# Patient Record
Sex: FEMALE | Race: WHITE | NOT HISPANIC OR LATINO | Employment: FULL TIME | URBAN - NONMETROPOLITAN AREA
[De-identification: names, ages, dates, MRNs, and addresses within clinical notes are randomized per-mention and may not be internally consistent; named-entity substitution may affect disease eponyms.]

---

## 2024-06-09 ENCOUNTER — OFFICE VISIT (OUTPATIENT)
Dept: URGENT CARE | Facility: CLINIC | Age: 49
End: 2024-06-09
Payer: COMMERCIAL

## 2024-06-09 VITALS
SYSTOLIC BLOOD PRESSURE: 130 MMHG | HEART RATE: 110 BPM | RESPIRATION RATE: 18 BRPM | WEIGHT: 158 LBS | HEIGHT: 64 IN | OXYGEN SATURATION: 99 % | TEMPERATURE: 99.5 F | BODY MASS INDEX: 26.98 KG/M2 | DIASTOLIC BLOOD PRESSURE: 70 MMHG

## 2024-06-09 DIAGNOSIS — J02.0 STREP PHARYNGITIS: Primary | ICD-10-CM

## 2024-06-09 LAB — S PYO AG THROAT QL: POSITIVE

## 2024-06-09 PROCEDURE — 87880 STREP A ASSAY W/OPTIC: CPT

## 2024-06-09 PROCEDURE — G0382 LEV 3 HOSP TYPE B ED VISIT: HCPCS

## 2024-06-09 PROCEDURE — S9083 URGENT CARE CENTER GLOBAL: HCPCS

## 2024-06-09 RX ORDER — ESCITALOPRAM OXALATE 20 MG/1
20 TABLET ORAL DAILY
COMMUNITY

## 2024-06-09 RX ORDER — AZITHROMYCIN 250 MG/1
500 TABLET, FILM COATED ORAL EVERY 24 HOURS
Qty: 10 TABLET | Refills: 0 | Status: SHIPPED | OUTPATIENT
Start: 2024-06-09 | End: 2024-06-14

## 2024-06-09 RX ORDER — RIZATRIPTAN BENZOATE 10 MG/1
10 TABLET ORAL AS NEEDED
COMMUNITY

## 2024-06-09 RX ORDER — ROSUVASTATIN CALCIUM 10 MG/1
10 TABLET, COATED ORAL DAILY
COMMUNITY

## 2024-06-09 RX ORDER — LEVOTHYROXINE SODIUM 0.1 MG/1
100 TABLET ORAL DAILY
COMMUNITY

## 2024-06-09 NOTE — PROGRESS NOTES
[facility]        NAME: Brandi Mccrary is a 49 y.o. female  : 1975    MRN: [id number]  DATE: 2024  TIME: 1:23 PM    Assessment and Plan   Strep pharyngitis [J02.0]  1. Strep pharyngitis  POCT rapid ANTIGEN strepA    azithromycin (ZITHROMAX) 250 mg tablet        Rapid POC strep testing positive. Will treat with Azithromycin and encouraged continued supportive measures.  Contagious for 24 hours. Follow up with PCP in 3-5 days or proceed to emergency department for worsening symptoms.  Patient verbalized understanding of instructions given.       Patient Instructions     Patient Instructions     Rapid POC strep testing positive  Take antibiotic as prescribed  Contagious for 24 hours  Continue with supportive measures, OTC Tylenol/Ibuprofen, nasal decongestants, and cough suppressants   Cool mist humidifiers, throat lozenges, salt gargles, honey, increased fluid intake and rest   Follow up with PCP in 3-5 days  Present to ER if symptoms worsen       If tests have been performed at [facility], our office will contact you with results if changes need to be made to the care plan discussed with you at the visit.  You can review your full results on [facility] MyChart.    Strep Throat   AMBULATORY CARE:   Strep throat  is a throat infection caused by bacteria. It is easily spread from person to person.  Common symptoms include the following:   Sore, red, and swollen throat    Fever and headache     Upset stomach, abdominal pain, or vomiting    White or yellow patches or blisters in the back of your throat    Tender, swollen lumps on the sides of your neck or jaw    Throat pain when you swallow    Call 911 for any of the following:   You have trouble breathing.      Seek care immediately if:   You have new symptoms like a bad headache, stiff neck, chest pain, or vomiting.    You are drooling because you cannot swallow your spit.    Contact your healthcare provider if:   You have a fever.    You have  a rash or ear pain.    You have green, yellow-brown, or bloody mucus when you cough or blow your nose.    You are unable to drink anything.    You have questions or concerns about your condition or care.    Treatment for strep throat  may include antibiotic medicine to treat your strep throat. You should feel better within 2 to 3 days after you start antibiotics. You may return to work or school 24 hours after you start antibiotics.  Manage strep throat:   Use lozenges, ice, soft foods, or popsicles  to soothe your throat.    Drink juice, milk shakes, or soup  if your throat is too sore to eat solid food. Drinking liquids can also help prevent dehydration.    Gargle with salt water.  Mix ¼ teaspoon salt in a glass of warm water and gargle. This may help reduce swelling in your throat.    Do not smoke.  Nicotine and other chemicals in cigarettes and cigars can cause lung damage and make your symptoms worse. Ask your healthcare provider for information if you currently smoke and need help to quit. E-cigarettes or smokeless tobacco still contain nicotine. Talk to your healthcare provider before you use these products.    Prevent the spread of strep throat:   Wash your hands often.  Use soap and water. Wash your hands after you use the bathroom, change a child's diapers, or sneeze. Wash your hands before you prepare or eat food.     Do not share food or drinks.  Replace your toothbrush after you have taken antibiotics for 24 hours.    Follow up with your doctor as directed:  Write down your questions so you remember to ask them during your visits.  © Copyright [employer] 2023 Information is for End User's use only and may not be sold, redistributed or otherwise used for commercial purposes.  The above information is an  only. It is not intended as medical advice for individual conditions or treatments. Talk to your doctor, nurse or pharmacist before following any medical regimen to see if it is safe and  effective for you.        Chief Complaint     Chief Complaint   Patient presents with    Sore Throat     Around Wed she woke up with pain in her throat, hurts when she swallows, and feels her throat glands are tender and swollen         History of Present Illness       49-year-old female with no significant past medical history presents with complaints of sore throat, trouble swallowing, and swollen lymph nodes x 4 days.  Denies fever, nasal congestion, cough, vomiting, or diarrhea.  Denies known sick contacts or exposures.     Sore Throat   Associated symptoms include trouble swallowing. Pertinent negatives include no abdominal pain, congestion, coughing, diarrhea, ear discharge, ear pain, shortness of breath or vomiting.       Review of Systems   Review of Systems   Constitutional:  Negative for chills and fever.   HENT:  Positive for sore throat and trouble swallowing. Negative for congestion, ear discharge, ear pain, rhinorrhea and voice change.    Eyes:  Negative for discharge.   Respiratory:  Negative for cough, shortness of breath and wheezing.    Cardiovascular:  Negative for chest pain.   Gastrointestinal:  Negative for abdominal pain, diarrhea, nausea and vomiting.   Musculoskeletal:  Negative for myalgias.   Skin:  Negative for rash.         Current Medications       Current Outpatient Medications:     azithromycin (ZITHROMAX) 250 mg tablet, Take 2 tablets (500 mg total) by mouth every 24 hours for 5 days, Disp: 10 tablet, Rfl: 0    escitalopram (LEXAPRO) 20 mg tablet, Take 20 mg by mouth daily, Disp: , Rfl:     estradiol (CLIMARA) 0.1 mg/24 hr, Place 1 patch on the skin once a week, Disp: , Rfl:     levothyroxine 100 mcg tablet, Take 100 mcg by mouth daily, Disp: , Rfl:     rizatriptan (MAXALT) 10 mg tablet, Take 10 mg by mouth as needed for migraine Take at the onset of migraine; if symptoms continue or return, may take another dose at least 2 hours after first dose. Take no more than 2 doses in a day.,  "Disp: , Rfl:     rosuvastatin (CRESTOR) 10 MG tablet, Take 10 mg by mouth daily, Disp: , Rfl:     Current Allergies     Allergies as of 06/09/2024 - Reviewed 06/09/2024   Allergen Reaction Noted    Cipro [ciprofloxacin hcl] Swelling 06/09/2024    Covid-19 ad26 vaccine([employer]) Other (See Comments) 06/09/2024    Penicillins Rash 06/09/2024            The following portions of the patient's history were reviewed and updated as appropriate: allergies, current medications, past family history, past medical history, past social history, past surgical history and problem list.     Past Medical History:   Diagnosis Date    Disease of thyroid gland     High cholesterol        Past Surgical History:   Procedure Laterality Date    HYSTERECTOMY      KIDNEY SURGERY      WISDOM TOOTH EXTRACTION         Family History   Problem Relation Age of Onset    Heart disease Mother     Cancer Mother     Diabetes Father     Diabetes Maternal Grandmother          Medications have been verified.        Objective   /70   Pulse (!) 110   Temp 99.5 °F (37.5 °C)   Resp 18   Ht 5' 4\" (1.626 m)   Wt 71.7 kg (158 lb)   SpO2 99%   BMI 27.12 kg/m²   No LMP recorded. Patient has had a hysterectomy.       Physical Exam     Physical Exam  Vitals and nursing note reviewed.   Constitutional:       General: She is not in acute distress.     Appearance: She is not toxic-appearing.   HENT:      Head: Normocephalic.      Right Ear: Tympanic membrane, ear canal and external ear normal.      Left Ear: Tympanic membrane, ear canal and external ear normal.      Nose: Nose normal.      Mouth/Throat:      Mouth: Mucous membranes are moist.      Pharynx: Posterior oropharyngeal erythema present.      Tonsils: No tonsillar exudate. 2+ on the right. 2+ on the left.   Eyes:      Conjunctiva/sclera: Conjunctivae normal.   Cardiovascular:      Rate and Rhythm: Regular rhythm. Tachycardia present.      Heart sounds: Normal heart sounds.   Pulmonary:      " Effort: Pulmonary effort is normal. No respiratory distress.      Breath sounds: Normal breath sounds. No stridor. No wheezing, rhonchi or rales.   Lymphadenopathy:      Cervical: Cervical adenopathy present.   Skin:     General: Skin is warm and dry.   Neurological:      Mental Status: She is alert and oriented to person, place, and time.      Gait: Gait is intact.   Psychiatric:         Mood and Affect: Mood normal.         Behavior: Behavior normal.

## 2024-06-09 NOTE — PATIENT INSTRUCTIONS
Rapid POC strep testing positive  Take antibiotic as prescribed  Contagious for 24 hours  Continue with supportive measures, OTC Tylenol/Ibuprofen, nasal decongestants, and cough suppressants   Cool mist humidifiers, throat lozenges, salt gargles, honey, increased fluid intake and rest   Follow up with PCP in 3-5 days  Present to ER if symptoms worsen       If tests have been performed at [facility], our office will contact you with results if changes need to be made to the care plan discussed with you at the visit.  You can review your full results on [facility].    Strep Throat   AMBULATORY CARE:   Strep throat  is a throat infection caused by bacteria. It is easily spread from person to person.  Common symptoms include the following:   Sore, red, and swollen throat    Fever and headache     Upset stomach, abdominal pain, or vomiting    White or yellow patches or blisters in the back of your throat    Tender, swollen lumps on the sides of your neck or jaw    Throat pain when you swallow    Call 911 for any of the following:   You have trouble breathing.      Seek care immediately if:   You have new symptoms like a bad headache, stiff neck, chest pain, or vomiting.    You are drooling because you cannot swallow your spit.    Contact your healthcare provider if:   You have a fever.    You have a rash or ear pain.    You have green, yellow-brown, or bloody mucus when you cough or blow your nose.    You are unable to drink anything.    You have questions or concerns about your condition or care.    Treatment for strep throat  may include antibiotic medicine to treat your strep throat. You should feel better within 2 to 3 days after you start antibiotics. You may return to work or school 24 hours after you start antibiotics.  Manage strep throat:   Use lozenges, ice, soft foods, or popsicles  to soothe your throat.    Drink juice, milk shakes, or soup  if your throat is too sore to eat solid food. Drinking  liquids can also help prevent dehydration.    Gargle with salt water.  Mix ¼ teaspoon salt in a glass of warm water and gargle. This may help reduce swelling in your throat.    Do not smoke.  Nicotine and other chemicals in cigarettes and cigars can cause lung damage and make your symptoms worse. Ask your healthcare provider for information if you currently smoke and need help to quit. E-cigarettes or smokeless tobacco still contain nicotine. Talk to your healthcare provider before you use these products.    Prevent the spread of strep throat:   Wash your hands often.  Use soap and water. Wash your hands after you use the bathroom, change a child's diapers, or sneeze. Wash your hands before you prepare or eat food.     Do not share food or drinks.  Replace your toothbrush after you have taken antibiotics for 24 hours.    Follow up with your doctor as directed:  Write down your questions so you remember to ask them during your visits.  © Copyright [employer] 2023 Information is for End User's use only and may not be sold, redistributed or otherwise used for commercial purposes.  The above information is an  only. It is not intended as medical advice for individual conditions or treatments. Talk to your doctor, nurse or pharmacist before following any medical regimen to see if it is safe and effective for you.

## 2024-11-11 ENCOUNTER — OFFICE VISIT (OUTPATIENT)
Dept: URGENT CARE | Facility: CLINIC | Age: 49
End: 2024-11-11
Payer: COMMERCIAL

## 2024-11-11 VITALS
RESPIRATION RATE: 16 BRPM | BODY MASS INDEX: 44.3 KG/M2 | HEART RATE: 103 BPM | OXYGEN SATURATION: 97 % | SYSTOLIC BLOOD PRESSURE: 126 MMHG | TEMPERATURE: 97.1 F | DIASTOLIC BLOOD PRESSURE: 76 MMHG | WEIGHT: 250 LBS | HEIGHT: 63 IN

## 2024-11-11 DIAGNOSIS — R10.31 RIGHT LOWER QUADRANT ABDOMINAL PAIN: Primary | ICD-10-CM

## 2024-11-11 PROCEDURE — G0382 LEV 3 HOSP TYPE B ED VISIT: HCPCS

## 2024-11-11 PROCEDURE — S9083 URGENT CARE CENTER GLOBAL: HCPCS

## 2024-11-11 NOTE — PROGRESS NOTES
[facility]        NAME: Brandi Mccrary is a 49 y.o. female  : 1975    MRN: [id number]  DATE: 2024  TIME: 3:40 PM    Assessment and Plan   Right lower quadrant abdominal pain [R10.31]  1. Right lower quadrant abdominal pain  Transfer to other facility        Discussed problem with patient.  Unclear etiology of symptoms.  Discussed ER outpatient follow-up for this since she is in no acute distress and low suspicion for appendicitis.  Patient also reports to the ER for further evaluation placing transfer of care order.  Patient reporting to [facility] via her own personal vehicle    Patient Instructions       Follow up with PCP in 3-5 days.  Proceed to  ER if symptoms worsen.    If tests are performed, our office will contact you with results only if changes need to made to the care plan discussed with you at the visit. You can review your full results on [facility].    Chief Complaint     Chief Complaint   Patient presents with    Abdominal Pain     Pt states that she has pain in her lower right front abdomin area for 5 days          History of Present Illness       49-year-old female presents with right lower quadrant pain for the last 5 days.  Past medical history of kidney stones as well as total hysterectomy.  Denies any fevers, chills, nausea, diarrhea, constipation.  Otherwise feels well except for abdominal pain.  Denies any urinary complaints but reports she has been struggling with urinary retention.  Eating drinking normally.6/10. Dull aching pain.   .    Abdominal Pain  Pertinent negatives include no constipation, diarrhea, fever, myalgias, nausea or vomiting.       Review of Systems   Review of Systems   Constitutional:  Negative for appetite change, chills, fatigue and fever.   Respiratory:  Negative for cough, shortness of breath, wheezing and stridor.    Cardiovascular:  Negative for chest pain and palpitations.   Gastrointestinal:  Positive for abdominal  "pain. Negative for blood in stool, constipation, diarrhea, nausea and vomiting.   Musculoskeletal:  Negative for myalgias.         Current Medications       Current Outpatient Medications:     escitalopram (LEXAPRO) 20 mg tablet, Take 20 mg by mouth daily, Disp: , Rfl:     estradiol (CLIMARA) 0.1 mg/24 hr, Place 1 patch on the skin once a week, Disp: , Rfl:     levothyroxine 100 mcg tablet, Take 100 mcg by mouth daily, Disp: , Rfl:     rizatriptan (MAXALT) 10 mg tablet, Take 10 mg by mouth as needed for migraine Take at the onset of migraine; if symptoms continue or return, may take another dose at least 2 hours after first dose. Take no more than 2 doses in a day., Disp: , Rfl:     rosuvastatin (CRESTOR) 10 MG tablet, Take 10 mg by mouth daily, Disp: , Rfl:     Current Allergies     Allergies as of 11/11/2024 - Reviewed 11/11/2024   Allergen Reaction Noted    Covid-19 (adenovirus) vaccine Swelling 08/20/2021    Cipro [ciprofloxacin hcl] Swelling 06/09/2024    Covid-19 ad26 vaccine([employer]) Other (See Comments) 06/09/2024    Penicillins Rash 06/09/2024    Pseudoephedrine Palpitations 05/02/2018            The following portions of the patient's history were reviewed and updated as appropriate: allergies, current medications, past family history, past medical history, past social history, past surgical history and problem list.     Past Medical History:   Diagnosis Date    Disease of thyroid gland     High cholesterol        Past Surgical History:   Procedure Laterality Date    HYSTERECTOMY      KIDNEY SURGERY      WISDOM TOOTH EXTRACTION         Family History   Problem Relation Age of Onset    Heart disease Mother     Cancer Mother     Diabetes Father     Diabetes Maternal Grandmother          Medications have been verified.        Objective   /76   Pulse 103   Temp (!) 97.1 °F (36.2 °C)   Resp 16   Ht 5' 3\" (1.6 m)   Wt 113 kg (250 lb)   SpO2 97%   BMI 44.29 kg/m²        Physical Exam     Physical " Exam  Vitals and nursing note reviewed.   Constitutional:       General: She is not in acute distress.     Appearance: She is well-developed and normal weight. She is not ill-appearing, toxic-appearing or diaphoretic.   HENT:      Head: Normocephalic.      Mouth/Throat:      Mouth: Mucous membranes are moist.      Pharynx: Oropharynx is clear. No pharyngeal swelling or oropharyngeal exudate.   Eyes:      General: No scleral icterus.     Extraocular Movements: Extraocular movements intact.      Pupils: Pupils are equal, round, and reactive to light.   Cardiovascular:      Rate and Rhythm: Normal rate and regular rhythm.      Heart sounds: Normal heart sounds. No murmur heard.     No friction rub. No gallop.   Pulmonary:      Effort: Pulmonary effort is normal. No respiratory distress.      Breath sounds: Normal breath sounds. No stridor. No wheezing, rhonchi or rales.   Chest:      Chest wall: No tenderness.   Abdominal:      General: Abdomen is flat. Bowel sounds are normal. There is no distension or abdominal bruit. There are no signs of injury.      Palpations: Abdomen is soft. There is no shifting dullness, fluid wave, hepatomegaly, splenomegaly, mass or pulsatile mass.      Tenderness: There is abdominal tenderness in the right lower quadrant. There is no right CVA tenderness, left CVA tenderness, guarding or rebound. Negative signs include Haywood's sign, Rovsing's sign, McBurney's sign, psoas sign and obturator sign.      Hernia: No hernia is present.   Neurological:      Mental Status: She is alert.

## 2024-11-12 ENCOUNTER — TELEPHONE (OUTPATIENT)
Age: 49
End: 2024-11-12

## 2024-11-12 NOTE — TELEPHONE ENCOUNTER
Patient calling in to schedule kidney stone apt. Patient will call back once records are in. She will reach out to [facility] records department.

## 2024-11-13 NOTE — TELEPHONE ENCOUNTER
Diagnosis/Complaint:Stones    Insurance:[facility] ID#[id number]  gp#[id number]    History cancer:no    Previous urologist:    Outside testing/where:[facility] ed    If yes what kind:ct scan    Records requested:pt faxing    Preferred location:[place]

## 2024-11-13 NOTE — TELEPHONE ENCOUNTER
Left detailed message for patient that we do not have any sooner appointments at this time. Left message regarding ER precautions and also if something sooner comes available will contact patient.

## 2024-11-13 NOTE — TELEPHONE ENCOUNTER
Npt scheduled 12/12/24 I was unable to link [facility] care everywhere for updated records pt faxing medical records from recent [facility] ED visit,if needs to be seen sooner than scheduled appointment please contact her directly

## 2024-11-22 ENCOUNTER — TELEPHONE (OUTPATIENT)
Dept: UROLOGY | Facility: CLINIC | Age: 49
End: 2024-11-22

## 2024-11-22 NOTE — TELEPHONE ENCOUNTER
Left message for pt to call office back. Please get more information. Did pt ever go to ER like recommended at . Did pt have any imaging. Pt is not participating in care everywhere so I am unable to see if she presented to ER or had CT scan done. Is pt still experiencing pain? Is she having any blood in her urine.

## 2024-11-22 NOTE — TELEPHONE ENCOUNTER
Patient stated she went to ER 11/11/24 at [facility] and she went to her family doctor on 11/13/24. She will have the family doctor fax over the information including ct scan.  She stated she will speak to [facility] to see why care everywhere is not updating.  She will call next week to make sure records were received. Fax number was provided for the office.

## 2024-12-02 PROBLEM — Z87.442 HISTORY OF RENAL CALCULI: Status: ACTIVE | Noted: 2024-12-02

## 2024-12-02 PROBLEM — N39.3 URINARY, INCONTINENCE, STRESS FEMALE: Status: ACTIVE | Noted: 2024-12-02

## 2024-12-02 NOTE — PROGRESS NOTES
UROLOGY PROGRESS NOTE         NAME: Brandi Mccrary  AGE: 49 y.o. SEX: female  : 1975   MRN: [id number]    DATE: 2024  TIME: 7:55 AM    Assessment and Plan   Procedures     Impression:   1. History of renal calculi  2. Urinary, incontinence, stress female  3. Renal calculi  4. Ureteral calculi  5. Hydronephrosis with urinary obstruction due to ureteral calculus       Plan: Will continue Kegels she will try Ditropan XL 10 mg for mixed incontinence.  We gave her a strainer, we will set up for CT stone protocol next week call her with the results.  She will continue Flomax.      Chief Complaint   No chief complaint on file.    History of Present Illness     HPI: Brandi Mccrary is a 49 y.o. year old female who presents with evaluation of renal calculi.  Apparently there are records from [facility].  Also with a history of stress urinary incontinence.    Last CT scan I see is from  that showed 5 mm stone in the right ureter at the level of the distal ureter.  Bilateral renal calculi.  Apparently patient in ER at [facility] 2024 with right lower quadrant pain.  CT scan showed a right obstructing stone.  There was a obstructing stone in the right mid ureter.  It was measured 4 mm.  She had nonobstructing bilateral renal stones largest 5 mm.  Urinalysis in the ER did not show infection normal creatinine.    Patient with both stress and urge incontinence stress is more bothersome she is doing Kegels.  We talked about an [facility] that she is interested in.    Regarding the stone she would like to manage it nonsurgically requesting to continue Flomax, strainer and consider oral Toradol.    The following portions of the patient's history were reviewed and updated as appropriate: allergies, current medications, past family history, past medical history, past social history, past surgical history and problem list.  Past Medical History:   Diagnosis Date    Anxiety     BRCA gene mutation  negative     Disease of thyroid gland     Endometriosis     High cholesterol     Hypercholesterolemia     Hypothyroidism     Kidney stones     Migraine     Ovarian cyst      Past Surgical History:   Procedure Laterality Date    HYSTERECTOMY      KIDNEY SURGERY      WISDOM TOOTH EXTRACTION       shoulder  Review of Systems     Const: Denies chills, fever and weight loss.  CV: Denies chest pain.  Resp: Denies SOB.  GI: Denies abdominal pain, nausea and vomiting.  : Denies symptoms other than stated above.  Musculo: Denies back pain.    Objective   There were no vitals taken for this visit.    Physical Exam  Const: Appears healthy and well developed. No signs of acute distress present.  Resp: Respirations are regular and unlabored.   CV: Rate is regular. Rhythm is regular.  Abdomen: Abdomen is soft, nontender, and nondistended. Kidneys are not palpable.  : Right lower quadrant pain  Psych: Patient's attitude is cooperative. Mood is normal. Affect is normal.    Current Medications     Current Outpatient Medications:     escitalopram (LEXAPRO) 20 mg tablet, Take 20 mg by mouth daily, Disp: , Rfl:     estradiol (CLIMARA) 0.1 mg/24 hr, Place 1 patch on the skin once a week, Disp: , Rfl:     levothyroxine 100 mcg tablet, Take 100 mcg by mouth daily, Disp: , Rfl:     Multiple Vitamins-Minerals (MULTIVITAL-M PO), Take by mouth, Disp: , Rfl:     rizatriptan (MAXALT) 10 mg tablet, Take 10 mg by mouth as needed for migraine Take at the onset of migraine; if symptoms continue or return, may take another dose at least 2 hours after first dose. Take no more than 2 doses in a day., Disp: , Rfl:     rosuvastatin (CRESTOR) 10 MG tablet, Take 10 mg by mouth daily, Disp: , Rfl:         Greg Manzano MD

## 2024-12-11 PROBLEM — N20.0 RENAL CALCULI: Status: ACTIVE | Noted: 2024-12-11

## 2024-12-11 PROBLEM — N13.2 HYDRONEPHROSIS WITH URINARY OBSTRUCTION DUE TO URETERAL CALCULUS: Status: ACTIVE | Noted: 2024-12-11

## 2024-12-11 PROBLEM — N20.1 URETERAL CALCULI: Status: ACTIVE | Noted: 2024-12-11

## 2024-12-12 ENCOUNTER — OFFICE VISIT (OUTPATIENT)
Dept: UROLOGY | Facility: CLINIC | Age: 49
End: 2024-12-12
Payer: COMMERCIAL

## 2024-12-12 VITALS
HEIGHT: 63 IN | HEART RATE: 99 BPM | OXYGEN SATURATION: 98 % | WEIGHT: 162 LBS | TEMPERATURE: 98 F | SYSTOLIC BLOOD PRESSURE: 122 MMHG | BODY MASS INDEX: 28.7 KG/M2 | DIASTOLIC BLOOD PRESSURE: 84 MMHG

## 2024-12-12 DIAGNOSIS — N20.0 RENAL CALCULI: ICD-10-CM

## 2024-12-12 DIAGNOSIS — N20.1 URETERAL CALCULI: ICD-10-CM

## 2024-12-12 DIAGNOSIS — Z87.442 HISTORY OF RENAL CALCULI: Primary | ICD-10-CM

## 2024-12-12 DIAGNOSIS — N13.2 HYDRONEPHROSIS WITH URINARY OBSTRUCTION DUE TO URETERAL CALCULUS: ICD-10-CM

## 2024-12-12 DIAGNOSIS — N39.3 URINARY, INCONTINENCE, STRESS FEMALE: ICD-10-CM

## 2024-12-12 LAB
SL AMB  POCT GLUCOSE, UA: NORMAL
SL AMB LEUKOCYTE ESTERASE,UA: NORMAL
SL AMB POCT BILIRUBIN,UA: NORMAL
SL AMB POCT BLOOD,UA: NORMAL
SL AMB POCT CLARITY,UA: CLEAR
SL AMB POCT COLOR,UA: YELLOW
SL AMB POCT KETONES,UA: NORMAL
SL AMB POCT NITRITE,UA: NORMAL
SL AMB POCT PH,UA: 7
SL AMB POCT SPECIFIC GRAVITY,UA: 1.01
SL AMB POCT URINE PROTEIN: NORMAL
SL AMB POCT UROBILINOGEN: 0.2

## 2024-12-12 PROCEDURE — 99204 OFFICE O/P NEW MOD 45 MIN: CPT | Performed by: UROLOGY

## 2024-12-12 PROCEDURE — 81003 URINALYSIS AUTO W/O SCOPE: CPT | Performed by: UROLOGY

## 2024-12-12 RX ORDER — KETOROLAC TROMETHAMINE 10 MG/1
10 TABLET, FILM COATED ORAL EVERY 6 HOURS PRN
Qty: 337.5 TABLET | Refills: 0 | Status: SHIPPED | OUTPATIENT
Start: 2024-12-12

## 2024-12-12 RX ORDER — TAMSULOSIN HYDROCHLORIDE 0.4 MG/1
1 CAPSULE ORAL DAILY
COMMUNITY
Start: 2024-11-20

## 2024-12-12 RX ORDER — OXYBUTYNIN CHLORIDE 10 MG/1
10 TABLET, EXTENDED RELEASE ORAL
Qty: 90 TABLET | Refills: 3 | Status: SHIPPED | OUTPATIENT
Start: 2024-12-12

## 2024-12-12 RX ORDER — ONDANSETRON 4 MG/1
TABLET, ORALLY DISINTEGRATING ORAL
COMMUNITY
Start: 2024-11-11

## 2024-12-17 ENCOUNTER — HOSPITAL ENCOUNTER (OUTPATIENT)
Dept: CT IMAGING | Facility: HOSPITAL | Age: 49
Discharge: HOME | End: 2024-12-17
Attending: UROLOGY
Payer: COMMERCIAL

## 2024-12-17 DIAGNOSIS — N20.1 URETERAL CALCULI: ICD-10-CM

## 2024-12-17 PROCEDURE — 74176 CT ABD & PELVIS W/O CONTRAST: CPT

## 2024-12-18 ENCOUNTER — TELEPHONE (OUTPATIENT)
Dept: UROLOGY | Facility: CLINIC | Age: 49
End: 2024-12-18

## 2024-12-18 ENCOUNTER — RESULTS FOLLOW-UP (OUTPATIENT)
Dept: UROLOGY | Facility: CLINIC | Age: 49
End: 2024-12-18

## 2024-12-18 DIAGNOSIS — N20.0 RENAL CALCULI: Primary | ICD-10-CM

## 2024-12-18 RX ORDER — KETOROLAC TROMETHAMINE 10 MG/1
10 TABLET, FILM COATED ORAL EVERY 6 HOURS PRN
Qty: 20 TABLET | Refills: 0 | Status: CANCELLED | OUTPATIENT
Start: 2024-12-18 | End: 2024-12-23

## 2024-12-18 NOTE — TELEPHONE ENCOUNTER
Patient called stating she got a call from DR. Manzano this morning . Message below    Left voicemail for patient to call us back regarding her CAT scan results and still shows the ureteral stone.  Her options include observation or surgical.  I told the patient to call today otherwise I will be gone for 3 weeks so keep an eye on this please.  If she calls warm away certainly one of the other doctors and [facility] can handle whether she wants surgery or not thanks     She would like to go ahead with surgery and she continues to have pain.  She stated she has a lot of scar tissue from other surgeries and she knows the stone might not come out by it self.  She wants to know if once the surgery for the removal of stone and if the scar tissue can be dealt with and also if any other stone stones in the kidney.  She wants to know how soon this can be done.  She is having issues getting toradol .  It was sent to his her mail order and it was cancelled.  Please review and send it to [facility] on [address] She will only take the medication as needed for the pain.    She would like a call today to see how to proceed.     She can be reached at [phone]

## 2024-12-18 NOTE — TELEPHONE ENCOUNTER
Had a long discussion with the patient regarding her CT scan.  It has been 5 weeks stone still has not moved.  Despite no hydro the patient states she wants her stone removed.  She is agree with a cystoscopy right ureteroscopy laser lithotripsy and right stent.        She knows I am not available for the next 3 weeks but she would like to first available doctor so I will send to my .  Patient will eventually 1 outpatient lithotripsy she knows that would be done down at [place] for the other stones in her kidney.  She had asked about could the urologist that does the surgery move the stones on the right kidney as well as in the ureter but I told her that the decision she will need to talk with him about prior to the procedure.  I did recommend outpatient lithotripsy for the renal stones.

## 2024-12-18 NOTE — TELEPHONE ENCOUNTER
Patient returning Dr. Manzano's call, she will have her phone on so that she doesn't miss the call again     #[phone]

## 2024-12-19 ENCOUNTER — PREP FOR PROCEDURE (OUTPATIENT)
Dept: UROLOGY | Facility: CLINIC | Age: 49
End: 2024-12-19

## 2024-12-19 DIAGNOSIS — N20.1 URETERAL STONE: Primary | ICD-10-CM

## 2024-12-19 NOTE — TELEPHONE ENCOUNTER
Spoke with patient and confirmed surgery date of: 12/26  Type of surgery: right #5  Operating physician: Dr. Hansen  [facility] of surgery: [facility]    Verbally went over prep with patient on: 12/19  NPO  Bowel prep? No  Hospital calls afternoon prior with arrival time -Calls Friday afternoon for Monday surgeries  Patient needs ride to and from surgery (outpatient)   Pre-op testing to be done 2 weeks prior to surgery  (N/A)  Blood thinners: N/A  Clearances needed: (None)    Emailed to patient on: 12/19  Copy of packet scanned into Media on:  Labs in packet  Soap / Bowel prep in packet  Post-op in packet  Date 12/31    Consent: on admit

## 2024-12-20 NOTE — PRE-PROCEDURE INSTRUCTIONS
Pre-Surgery Instructions:   Medication Instructions    escitalopram (LEXAPRO) 20 mg tablet Take night before surgery    estradiol (CLIMARA) 0.1 mg/24 hr -    levothyroxine 100 mcg tablet Take day of surgery.    Multiple Vitamins-Minerals (MULTIVITAL-M PO) Stop taking 7 days prior to surgery.    Naproxen Sodium (ALEVE PO) Stop taking 3 days prior to surgery.    ondansetron (ZOFRAN-ODT) 4 mg disintegrating tablet Uses PRN- OK to take day of surgery    rizatriptan (MAXALT) 10 mg tablet Uses PRN- OK to take day of surgery    rosuvastatin (CRESTOR) 10 MG tablet Take night before surgery    tamsulosin (FLOMAX) 0.4 mg Take night before surgery    Medication instructions for day surgery reviewed. Please use only a sip of water to take your instructed medications. Avoid all over the counter vitamins, supplements and NSAIDS for one week prior to surgery per anesthesia guidelines. Tylenol is ok to take as needed.     You will receive a call one business day prior to surgery with an arrival time and hospital directions. If your surgery is scheduled on a Monday, the hospital will be calling you on the Friday prior to your surgery. If you have not heard from anyone by 8pm, please call the hospital supervisor through the hospital  at [phone]. ([place] [phone] or [place] [phone]).    Do not eat or drink anything after midnight the night before your surgery, including candy, mints, lifesavers, or chewing gum. Do not drink alcohol 24hrs before your surgery. Try not to smoke at least 24hrs before your surgery.       Follow the pre surgery showering instructions as listed in the “My Surgical Experience Booklet” or otherwise provided by your surgeon's office. Do not use a blade to shave the surgical area 1 week before surgery. It is okay to use a clean electric clippers up to 24 hours before surgery. Do not apply any lotions, creams, including makeup, cologne, deodorant, or perfumes after showering on the day of  your surgery. Do not use dry shampoo, hair spray, hair gel, or any type of hair products.     No contact lenses, eye make-up, or artificial eyelashes. Remove nail polish, including gel polish, and any artificial, gel, or acrylic nails if possible. Remove all jewelry including rings and body piercing jewelry.     Wear causal clothing that is easy to take on and off. Consider your type of surgery.    Keep any valuables, jewelry, piercings at home. Please bring any specially ordered equipment (sling, braces) if indicated.    Arrange for a responsible person to drive you to and from the hospital on the day of your surgery. Please confirm the visitor policy for the day of your procedure when you receive your phone call with an arrival time.     Call the surgeon's office with any new illnesses, exposures, or additional questions prior to surgery.    Please reference your “My Surgical Experience Booklet” for additional information to prepare for your upcoming surgery.  Pt will get antibacterial soap.

## 2024-12-25 PROCEDURE — NC001 PR NO CHARGE: Performed by: UROLOGY

## 2024-12-26 ENCOUNTER — ANESTHESIA (OUTPATIENT)
Dept: PERIOP | Facility: HOSPITAL | Age: 49
End: 2024-12-26
Payer: COMMERCIAL

## 2024-12-26 ENCOUNTER — HOSPITAL ENCOUNTER (OUTPATIENT)
Facility: HOSPITAL | Age: 49
Setting detail: OUTPATIENT SURGERY
Discharge: HOME | End: 2024-12-26
Attending: UROLOGY | Admitting: UROLOGY
Payer: COMMERCIAL

## 2024-12-26 ENCOUNTER — ANESTHESIA EVENT (OUTPATIENT)
Dept: PERIOP | Facility: HOSPITAL | Age: 49
End: 2024-12-26
Payer: COMMERCIAL

## 2024-12-26 ENCOUNTER — APPOINTMENT (OUTPATIENT)
Dept: RADIOLOGY | Facility: HOSPITAL | Age: 49
End: 2024-12-26
Payer: COMMERCIAL

## 2024-12-26 ENCOUNTER — TELEPHONE (OUTPATIENT)
Dept: UROLOGY | Facility: CLINIC | Age: 49
End: 2024-12-26

## 2024-12-26 VITALS
WEIGHT: 160 LBS | DIASTOLIC BLOOD PRESSURE: 71 MMHG | HEIGHT: 63 IN | HEART RATE: 65 BPM | SYSTOLIC BLOOD PRESSURE: 127 MMHG | OXYGEN SATURATION: 99 % | TEMPERATURE: 97 F | BODY MASS INDEX: 28.35 KG/M2 | RESPIRATION RATE: 15 BRPM

## 2024-12-26 DIAGNOSIS — N20.1 URETERAL CALCULI: Primary | ICD-10-CM

## 2024-12-26 DIAGNOSIS — N20.0 RENAL CALCULI: ICD-10-CM

## 2024-12-26 PROBLEM — E03.9 HYPOTHYROIDISM: Status: ACTIVE | Noted: 2024-12-26

## 2024-12-26 PROCEDURE — 52351 CYSTOURETERO & OR PYELOSCOPE: CPT | Performed by: UROLOGY

## 2024-12-26 PROCEDURE — 74420 UROGRAPHY RTRGR +-KUB: CPT

## 2024-12-26 PROCEDURE — C1758 CATHETER, URETERAL: HCPCS | Performed by: UROLOGY

## 2024-12-26 PROCEDURE — 52332 CYSTOSCOPY AND TREATMENT: CPT | Performed by: UROLOGY

## 2024-12-26 PROCEDURE — C1769 GUIDE WIRE: HCPCS | Performed by: UROLOGY

## 2024-12-26 PROCEDURE — C2625 STENT, NON-COR, TEM W/DEL SY: HCPCS | Performed by: UROLOGY

## 2024-12-26 DEVICE — INLAY OPTIMA URETERAL STENT W/O GUIDEWIRE
Type: IMPLANTABLE DEVICE | Site: URETER | Status: FUNCTIONAL
Brand: BARD® INLAY OPTIMA® URETERAL STENT

## 2024-12-26 RX ORDER — LIDOCAINE HYDROCHLORIDE 10 MG/ML
INJECTION, SOLUTION EPIDURAL; INFILTRATION; INTRACAUDAL; PERINEURAL AS NEEDED
Status: DISCONTINUED | OUTPATIENT
Start: 2024-12-26 | End: 2024-12-26

## 2024-12-26 RX ORDER — MAGNESIUM HYDROXIDE 1200 MG/15ML
LIQUID ORAL AS NEEDED
Status: DISCONTINUED | OUTPATIENT
Start: 2024-12-26 | End: 2024-12-26 | Stop reason: HOSPADM

## 2024-12-26 RX ORDER — SODIUM CHLORIDE, SODIUM LACTATE, POTASSIUM CHLORIDE, CALCIUM CHLORIDE 600; 310; 30; 20 MG/100ML; MG/100ML; MG/100ML; MG/100ML
125 INJECTION, SOLUTION INTRAVENOUS CONTINUOUS
Status: DISCONTINUED | OUTPATIENT
Start: 2024-12-26 | End: 2024-12-26 | Stop reason: HOSPADM

## 2024-12-26 RX ORDER — SODIUM CHLORIDE, SODIUM LACTATE, POTASSIUM CHLORIDE, CALCIUM CHLORIDE 600; 310; 30; 20 MG/100ML; MG/100ML; MG/100ML; MG/100ML
INJECTION, SOLUTION INTRAVENOUS CONTINUOUS PRN
Status: DISCONTINUED | OUTPATIENT
Start: 2024-12-26 | End: 2024-12-26

## 2024-12-26 RX ORDER — NITROFURANTOIN 25; 75 MG/1; MG/1
100 CAPSULE ORAL 2 TIMES DAILY
Qty: 14 CAPSULE | Refills: 0 | Status: SHIPPED | OUTPATIENT
Start: 2024-12-26

## 2024-12-26 RX ORDER — PROMETHAZINE HYDROCHLORIDE 25 MG/ML
12.5 INJECTION, SOLUTION INTRAMUSCULAR; INTRAVENOUS ONCE AS NEEDED
Status: DISCONTINUED | OUTPATIENT
Start: 2024-12-26 | End: 2024-12-26 | Stop reason: HOSPADM

## 2024-12-26 RX ORDER — MIDAZOLAM HYDROCHLORIDE 2 MG/2ML
INJECTION, SOLUTION INTRAMUSCULAR; INTRAVENOUS AS NEEDED
Status: DISCONTINUED | OUTPATIENT
Start: 2024-12-26 | End: 2024-12-26

## 2024-12-26 RX ORDER — DEXAMETHASONE SODIUM PHOSPHATE 10 MG/ML
INJECTION, SOLUTION INTRAMUSCULAR; INTRAVENOUS AS NEEDED
Status: DISCONTINUED | OUTPATIENT
Start: 2024-12-26 | End: 2024-12-26

## 2024-12-26 RX ORDER — PHENYLEPHRINE HCL IN 0.9% NACL 1 MG/10 ML
SYRINGE (ML) INTRAVENOUS AS NEEDED
Status: DISCONTINUED | OUTPATIENT
Start: 2024-12-26 | End: 2024-12-26

## 2024-12-26 RX ORDER — FENTANYL CITRATE 50 UG/ML
INJECTION, SOLUTION INTRAMUSCULAR; INTRAVENOUS AS NEEDED
Status: DISCONTINUED | OUTPATIENT
Start: 2024-12-26 | End: 2024-12-26

## 2024-12-26 RX ORDER — ONDANSETRON 2 MG/ML
INJECTION INTRAMUSCULAR; INTRAVENOUS AS NEEDED
Status: DISCONTINUED | OUTPATIENT
Start: 2024-12-26 | End: 2024-12-26

## 2024-12-26 RX ORDER — HYDROMORPHONE HCL/PF 1 MG/ML
0.5 SYRINGE (ML) INJECTION
Status: DISCONTINUED | OUTPATIENT
Start: 2024-12-26 | End: 2024-12-26 | Stop reason: HOSPADM

## 2024-12-26 RX ORDER — ACETAMINOPHEN 10 MG/ML
1000 INJECTION, SOLUTION INTRAVENOUS ONCE
Status: COMPLETED | OUTPATIENT
Start: 2024-12-26 | End: 2024-12-26

## 2024-12-26 RX ORDER — PROPOFOL 10 MG/ML
INJECTION, EMULSION INTRAVENOUS AS NEEDED
Status: DISCONTINUED | OUTPATIENT
Start: 2024-12-26 | End: 2024-12-26

## 2024-12-26 RX ORDER — FENTANYL CITRATE/PF 50 MCG/ML
25 SYRINGE (ML) INJECTION
Status: DISCONTINUED | OUTPATIENT
Start: 2024-12-26 | End: 2024-12-26 | Stop reason: HOSPADM

## 2024-12-26 RX ORDER — LIDOCAINE HYDROCHLORIDE 20 MG/ML
JELLY TOPICAL AS NEEDED
Status: DISCONTINUED | OUTPATIENT
Start: 2024-12-26 | End: 2024-12-26 | Stop reason: HOSPADM

## 2024-12-26 RX ADMIN — GENTAMICIN SULFATE 120 MG: 40 INJECTION, SOLUTION INTRAMUSCULAR; INTRAVENOUS at 13:23

## 2024-12-26 RX ADMIN — Medication 100 MCG: at 13:58

## 2024-12-26 RX ADMIN — LIDOCAINE HYDROCHLORIDE 50 MG: 10 INJECTION, SOLUTION EPIDURAL; INFILTRATION; INTRACAUDAL; PERINEURAL at 13:27

## 2024-12-26 RX ADMIN — SODIUM CHLORIDE, SODIUM LACTATE, POTASSIUM CHLORIDE, AND CALCIUM CHLORIDE: .6; .31; .03; .02 INJECTION, SOLUTION INTRAVENOUS at 13:23

## 2024-12-26 RX ADMIN — FENTANYL CITRATE 25 MCG: 50 INJECTION INTRAMUSCULAR; INTRAVENOUS at 13:45

## 2024-12-26 RX ADMIN — PROPOFOL 100 MG: 10 INJECTION, EMULSION INTRAVENOUS at 13:29

## 2024-12-26 RX ADMIN — FENTANYL CITRATE 50 MCG: 50 INJECTION INTRAMUSCULAR; INTRAVENOUS at 13:27

## 2024-12-26 RX ADMIN — MIDAZOLAM 2 MG: 1 INJECTION INTRAMUSCULAR; INTRAVENOUS at 13:23

## 2024-12-26 RX ADMIN — FENTANYL CITRATE 25 MCG: 50 INJECTION INTRAMUSCULAR; INTRAVENOUS at 13:32

## 2024-12-26 RX ADMIN — DEXAMETHASONE SODIUM PHOSPHATE 10 MG: 10 INJECTION, SOLUTION INTRAMUSCULAR; INTRAVENOUS at 13:35

## 2024-12-26 RX ADMIN — ACETAMINOPHEN 1000 MG: 1000 INJECTION INTRAVENOUS at 13:36

## 2024-12-26 RX ADMIN — Medication 100 MCG: at 13:48

## 2024-12-26 RX ADMIN — PROPOFOL 200 MG: 10 INJECTION, EMULSION INTRAVENOUS at 13:27

## 2024-12-26 RX ADMIN — ONDANSETRON 4 MG: 2 INJECTION INTRAMUSCULAR; INTRAVENOUS at 13:42

## 2024-12-26 NOTE — H&P
H&P Exam - Urology   Brandi Mccrary 49 y.o. female MRN: [id number]  Unit/Bed#:  Encounter: [id number]    Assessment & Plan     Assessment:  4 mm obstructing mid right ureteral stone overlying the region of the iliac vasculature.  Patient failed M ET.  I reviewed the patient's record and imaging.  She wishes to proceed with cystoscopy with right ureteroscopy laser lithotripsy and right ureteral stent insertion.  The patient has known nonobstructing right renal calculi which are not the source of her present issues.  I spoke with my partner Dr. Manzano who had seen this patient previously.  In his discussion with the patient, the decision was made to manage the nonobstructing stones expectantly with follow-up imaging.  Plan:  Cystoscopy with right ureteroscopy laser lithotripsy and right ureteral stent placement.  The procedure its risks limitations outcomes and alternatives were reviewed and in complete detail and understood.  Risks including but not limited to bleeding infection ureteral injury ureteral stricture, inability to access the stone and the need for staged procedures, the potential need for nephrostomy tube and/or nephroureteral stent unable to place a stent amongst others were reviewed and understood.  Informed consent was obtained.    History of Present Illness   HPI:  Brandi Mccrary is a 49 y.o. female who presents with an obstructing 4 mm mid right ureteral stone causing intermittent flank pain symptoms.  She has failed medical expulsive therapy.  She now desires to proceed with intervention with ureteroscopy and laser lithotripsy..    Review of Systems:  Const: Denies chills, fever and weight loss.  CV: Denies chest pain.  Resp: Denies SOB.  GI: Denies abdominal pain, nausea and vomiting.  : Denies symptoms other than stated above.  Musculo: Denies back pain.    Historical Information   Past Medical History:   Diagnosis Date    Anesthesia complication     difficulty awakening    Anxiety      "Arthritis     Back pain     BRCA gene mutation negative     Disease of thyroid gland     hypo    Endometriosis     High cholesterol     Hypercholesterolemia     Hypothyroidism     Kidney stones     Migraine     Ovarian cyst     Sleep apnea     positional - no CPAP    Stress incontinence      Past Surgical History:   Procedure Laterality Date    HYSTERECTOMY      KIDNEY SURGERY      lithotripsy    OTHER SURGICAL HISTORY      Endometriosis surgery    WISDOM TOOTH EXTRACTION       Social History   Social History     Substance and Sexual Activity   Alcohol Use Never     Social History     Substance and Sexual Activity   Drug Use Not Currently     Social History     Tobacco Use   Smoking Status Never   Smokeless Tobacco Never     E-Cigarette/Vaping    E-Cigarette Use Never User      E-Cigarette/Vaping Substances    Nicotine No     THC No     CBD No     Flavoring No     Other No     Unknown No      Family History: non-contributory    Meds/Allergies   all medications and allergies reviewed  Allergies   Allergen Reactions    Covid-19 (Adenovirus) Vaccine Anaphylaxis     Facial swelling/mouth and throat numb    Cipro [Ciprofloxacin Hcl] Swelling     Arm became itchy and swollen    Penicillins Rash    Pseudoephedrine Palpitations       Objective   Vitals: Height 5' 3\" (1.6 m), weight 72.6 kg (160 lb).    No intake/output data recorded.    Invasive Devices       None                   Physical Exam:  Physical Exam  Const: Appears healthy and well developed. No signs of acute distress present.  Resp: Respirations are regular and unlabored.   CV: Rate is regular. Rhythm is regular.  Abdomen: Abdomen is soft, nontender, and nondistended. Kidneys are not palpable.  : Not performed  Psych: Patient's attitude is cooperative. Mood is normal. Affect is normal.    Lab Results: I have personally reviewed pertinent reports.    Imaging: I have personally reviewed pertinent reports.   and I have personally reviewed pertinent films in " PACS  EKG, Pathology, and Other Studies: I have personally reviewed pertinent reports.   and I have personally reviewed pertinent films in PACS  VTE Prophylaxis: Sequential compression device    Code Status: No Order  Advance Directive and Living Will:      Power of :    POLST:      Counseling / Coordination of Care  Total floor / unit time spent today not applicable minutes.  Greater than 50% of total time was spent with the patient and / or family counseling and / or coordination of care.  A description of the counseling / coordination of care: Not applicable.

## 2024-12-26 NOTE — ANESTHESIA POSTPROCEDURE EVALUATION
Post-Op Assessment Note    CV Status:  Stable    Pain management: adequate       Mental Status:  Sleepy   Hydration Status:  Euvolemic   PONV Controlled:  Controlled   Airway Patency:  Patent     Post Op Vitals Reviewed: Yes    No anethesia notable event occurred.    Staff: CRNA           Last Filed PACU Vitals:  Vitals Value Taken Time   Temp 97.4    Pulse 68 12/26/24 1420   /71 12/26/24 1420   Resp 13 12/26/24 1420   SpO2 98 % 12/26/24 1420   Vitals shown include unfiled device data.    Modified Davin:  No data recorded

## 2024-12-26 NOTE — TELEPHONE ENCOUNTER
----- Message from Mitchell Hansen MD sent at 12/26/2024  4:00 PM EST -----  Patient needs follow-up visit in 1 week to discuss next procedure

## 2024-12-26 NOTE — ANESTHESIA POSTPROCEDURE EVALUATION
Post-Op Assessment Note    CV Status:  Stable  Pain Score: 0    Pain management: adequate       Mental Status:  Alert and sleepy   Hydration Status:  Euvolemic   PONV Controlled:  Controlled   Airway Patency:  Patent     Post Op Vitals Reviewed: Yes    No anethesia notable event occurred.    Staff: Anesthesiologist           Last Filed PACU Vitals:  Vitals Value Taken Time   Temp 97.4 °F (36.3 °C) 12/26/24 1450   Pulse 64 12/26/24 1502   /59 12/26/24 1500   Resp 11 12/26/24 1502   SpO2 98 % 12/26/24 1502   Vitals shown include unfiled device data.    Modified Davin:  Activity: 2 (12/26/2024  2:50 PM)  Respiration: 2 (12/26/2024  2:50 PM)  Circulation: 2 (12/26/2024  2:50 PM)  Consciousness: 1 (12/26/2024  2:50 PM)  Oxygen Saturation: 2 (12/26/2024  2:50 PM)  Modified Davin Score: 9 (12/26/2024  2:50 PM)

## 2024-12-26 NOTE — ANESTHESIA PREPROCEDURE EVALUATION
Procedure:  CYSTOSCOPY URETEROSCOPY WITH LITHOTRIPSY HOLMIUM LASER, RETROGRADE PYELOGRAM AND INSERTION STENT URETERAL (Right: Ureter)    Relevant Problems   ANESTHESIA (within normal limits)      CARDIO (within normal limits)      ENDO   (+) Hypothyroidism      GI/HEPATIC (within normal limits)      /RENAL   (+) Hydronephrosis with urinary obstruction due to ureteral calculus   (+) Renal calculi      GYN (within normal limits)      HEMATOLOGY (within normal limits)      MUSCULOSKELETAL (within normal limits)      NEURO/PSYCH (within normal limits)      PULMONARY  Positional SHARON            Physical Exam    Airway    Mallampati score: II  TM Distance: >3 FB  Neck ROM: full     Dental   No notable dental hx     Cardiovascular  Cardiovascular exam normal    Pulmonary  Pulmonary exam normal     Other Findings  post-pubertal.      Anesthesia Plan  ASA Score- 2     Anesthesia Type- general with ASA Monitors.         Additional Monitors:     Airway Plan: LMA.           Plan Factors-Exercise tolerance (METS): >4 METS.    Chart reviewed.  Imaging results reviewed. Existing labs reviewed. Patient summary reviewed.    Patient is not a current smoker.      Obstructive sleep apnea risk education given perioperatively.        Induction- intravenous.    Postoperative Plan-         Informed Consent- Anesthetic plan and risks discussed with patient.  I personally reviewed this patient with the CRNA. Discussed and agreed on the Anesthesia Plan with the CRNA..    Discussed General Anesthesia with patient including but not limited to risk of cardiac insult, pulmonary complication, stroke, reaction to medications and death. All questions answered and consent was obtained.      NPO appropriate.

## 2024-12-26 NOTE — OP NOTE
OPERATIVE REPORT  PATIENT NAME: Brandi Mccrary    :  1975  MRN: [id number]  Pt Location: [facility] OR ROOM 01    SURGERY DATE: 2024    Surgeons and Role:     * Mitchell Hansen MD - Primary    Preop Diagnosis:  Ureteral stone [N20.1]    Post-Op Diagnosis Codes:     * Ureteral stone [N20.1]    Procedure(s):  Right - CYSTOSCOPY. URETEROSCOPY. RETROGRADE PYELOGRAM AND INSERTION STENT URETERAL    Specimen(s):  * No specimens in log *    Estimated Blood Loss:   Minimal    Drains:  Ureteral Internal Stent Right ureter 6 Fr. (Active)   Number of days: 0       Anesthesia Type:   Choice    Operative Indications:  Ureteral stone [N20.1]  Intermittent right flank pain.    Operative Findings:  Narrow mid right ureter, not able to dilate this area.  No gross evidence of tumor.  Also did not see a distinct stone.  Retrograde pyelogram reveals significant narrowing in this location for segment of a few centimeters and possible small filling defect just proximal to the area of narrowing.  Please see retrograde pyelogram.  Right ureteral stent placed 6 [place] by 26 cm.    Planned staged procedure leaving stent in for 2 weeks.  Will also repeat CT scan in interim.  Repeat U scope after ureter has passively dilated with stent.      Complications:   None    Procedure and Technique:  Patient was brought to the operating room identified and a timeout was satisfactory.  Patient was then placed in the supine position underwent her anesthetic by the anesthesia department.  She had received gentamicin IV preop.  SCDs were on and functioning.    Patient was then placed in lithotomy position.  Vagina was prepped and draped in usual sterile fashion using Betadine.  A well-lubricated 22 Mohawk cystoscope was then used for cystoscopy.  The urethra was normal the bladder was normal.  The right and left ureteral orifice were normal position.    035 hybrid Glidewire was then advanced up the right ureter without difficulty.  This was advanced  "under fluoroscopic guidance to the kidney.  There was no hang up.    The guidewire was left in position and the cystoscope removed.  I then reintroduced the cystoscope and performed a right retrograde pyelogram using a 5 Uzbek open-ended catheter adjacent to the guidewire.  The distal right ureter was normal.  At the level of the mid ureter near the upper sacrum and around the level of L5 on the right there was narrowing of the ureter noted but no gross filling defect.  This narrowing extended about 2 to 3 cm.  The ureter proximal to this was somewhat dilated.    I removed the open-ended catheter.  The cystoscope was removed leaving the guidewire in good position.  I then used a 10 Uzbek dual-lumen catheter to dilate the ureter.  I injected additional contrast.  There was a distinct narrow segment as noted above with proximal dilation of the ureter.  There is arguably a small filling defect at the proximal aspect of the narrowing which could potentially represent a small stone.  The narrow segment was tight and would not allow dilation.    The dual-lumen catheter was removed.  The guidewire remained in good position.  I then performed right ureteroscopy using the semirigid Olympus ureteroscope adjacent to the guidewire.  The distal ureter was normal in appearance.  There were no stones or other abnormalities.  The ureter remained normal in appearance up to the level of the mid ureter where the distinct narrowing was encountered.  The mucosa had a normal appearance however the ureter would not allow passage of the scope due to narrowing.  I did not visualize any stone up to this point.  I injected additional contrast with similar findings to that noted previously.  There was no extravasation.    The patient had related history of multiple previous ureteroscopy's and stone issues over the years and a pre-existing narrowing of the right ureter possibly related to \" scarring\".     I felt it best to place a stent and " return for second stage procedure after the stent has hopefully allow the ureter to passively dilate.  We will also consider repeating a CT scan to see if the stone remains.    Findings suggest either a benign-appearing stricture with no stone or narrowing/stricture with stone remaining just proximal to the area of narrowing.    The ureteroscope was removed in its entirety.  Pullout ureteroscopy was unremarkable.    The guidewire remained in good position.  I then back fed the cystoscope over the guidewire and placed a 6 [place] by 26 cm [place] inlay double-J ureteral stent with a short string tail.  This deployed nicely with a satisfactory coil in the right kidney and bladder on fluoroscopic imaging.  The system drained nicely.  The bladder was drained and all instruments removed.    Patient tolerated procedure well her anesthetic was reversed and she was returned to recovery room in satisfactory condition.  Findings will be discussed with the patient.  Plans are as above.  We plan to discharge her on an antibiotic antispasmodic as well.   I was present for the entire procedure.    Patient Disposition:  PACU              SIGNATURE: Feliz Hansen MD  DATE: December 26, 2024  TIME: 2:09 PM

## 2024-12-26 NOTE — DISCHARGE INSTR - AVS FIRST PAGE
Please  your antibiotic at your pharmacy along with Pyridium and an antispasmodic.  The office will contact you for follow-up visit/procedure

## 2024-12-26 NOTE — INTERVAL H&P NOTE
H&P reviewed. After examining the patient I find no changes in the patients condition since the H&P had been written.    Vitals:    12/26/24 1113   BP: 137/84   Pulse: 95   Resp: 18   Temp: 97.6 °F (36.4 °C)   SpO2: 97%

## 2024-12-27 RX ORDER — PHENAZOPYRIDINE HYDROCHLORIDE 200 MG/1
200 TABLET, FILM COATED ORAL
Qty: 10 TABLET | Refills: 0 | Status: SHIPPED | OUTPATIENT
Start: 2024-12-27

## 2024-12-27 NOTE — TELEPHONE ENCOUNTER
Patient called in stating pyridium and an antispasmodic should have been sent to [facility] # [id number]. Please advise.

## 2024-12-27 NOTE — TELEPHONE ENCOUNTER
I sent a prescription for Pyridium to [facility] pharmacy.  Patient is already prescribed oxybutynin XL 10 mg daily.

## 2024-12-29 DIAGNOSIS — N20.1 URETERAL CALCULI: Primary | ICD-10-CM

## 2024-12-30 ENCOUNTER — HOSPITAL ENCOUNTER (EMERGENCY)
Facility: HOSPITAL | Age: 49
Discharge: HOME | End: 2024-12-31
Attending: EMERGENCY MEDICINE
Payer: COMMERCIAL

## 2024-12-30 DIAGNOSIS — N13.30 HYDRONEPHROSIS, RIGHT: ICD-10-CM

## 2024-12-30 DIAGNOSIS — Z96.0 URETERAL STENT PRESENT: ICD-10-CM

## 2024-12-30 DIAGNOSIS — N20.1 URETEROLITHIASIS: ICD-10-CM

## 2024-12-30 DIAGNOSIS — R10.9 ABDOMINAL PAIN: Primary | ICD-10-CM

## 2024-12-30 PROCEDURE — 99285 EMERGENCY DEPT VISIT HI MDM: CPT | Performed by: EMERGENCY MEDICINE

## 2024-12-30 PROCEDURE — 99284 EMERGENCY DEPT VISIT MOD MDM: CPT

## 2024-12-30 RX ORDER — MORPHINE SULFATE 4 MG/ML
4 INJECTION, SOLUTION INTRAMUSCULAR; INTRAVENOUS ONCE
Status: DISCONTINUED | OUTPATIENT
Start: 2024-12-31 | End: 2024-12-31 | Stop reason: HOSPADM

## 2024-12-30 RX ORDER — ONDANSETRON 2 MG/ML
4 INJECTION INTRAMUSCULAR; INTRAVENOUS ONCE
Status: COMPLETED | OUTPATIENT
Start: 2024-12-31 | End: 2024-12-31

## 2024-12-31 ENCOUNTER — APPOINTMENT (EMERGENCY)
Dept: CT IMAGING | Facility: HOSPITAL | Age: 49
End: 2024-12-31
Payer: COMMERCIAL

## 2024-12-31 ENCOUNTER — PROCEDURE VISIT (OUTPATIENT)
Dept: UROLOGY | Facility: CLINIC | Age: 49
End: 2024-12-31

## 2024-12-31 VITALS
HEART RATE: 84 BPM | SYSTOLIC BLOOD PRESSURE: 148 MMHG | DIASTOLIC BLOOD PRESSURE: 73 MMHG | TEMPERATURE: 98 F | OXYGEN SATURATION: 98 % | RESPIRATION RATE: 18 BRPM

## 2024-12-31 VITALS
SYSTOLIC BLOOD PRESSURE: 134 MMHG | OXYGEN SATURATION: 96 % | DIASTOLIC BLOOD PRESSURE: 78 MMHG | BODY MASS INDEX: 28.53 KG/M2 | TEMPERATURE: 97.8 F | WEIGHT: 161 LBS | HEART RATE: 105 BPM | HEIGHT: 63 IN

## 2024-12-31 DIAGNOSIS — N13.5 URETERAL STRICTURE: Primary | ICD-10-CM

## 2024-12-31 DIAGNOSIS — N20.1 URETERAL CALCULI: ICD-10-CM

## 2024-12-31 LAB
ALBUMIN SERPL BCG-MCNC: 4.9 G/DL (ref 3.5–5)
ALP SERPL-CCNC: 69 U/L (ref 34–104)
ALT SERPL W P-5'-P-CCNC: 25 U/L (ref 7–52)
ANION GAP SERPL CALCULATED.3IONS-SCNC: 6 MMOL/L (ref 4–13)
APTT PPP: 25 SECONDS (ref 23–34)
AST SERPL W P-5'-P-CCNC: 27 U/L (ref 13–39)
BACTERIA UR QL AUTO: ABNORMAL /HPF
BASOPHILS # BLD AUTO: 0.08 THOUSANDS/ΜL (ref 0–0.1)
BASOPHILS NFR BLD AUTO: 1 % (ref 0–1)
BILIRUB SERPL-MCNC: 0.74 MG/DL (ref 0.2–1)
BILIRUB UR QL STRIP: NEGATIVE
BUN SERPL-MCNC: 14 MG/DL (ref 5–25)
CALCIUM SERPL-MCNC: 9.5 MG/DL (ref 8.4–10.2)
CHLORIDE SERPL-SCNC: 101 MMOL/L (ref 96–108)
CLARITY UR: CLEAR
CO2 SERPL-SCNC: 29 MMOL/L (ref 21–32)
COLOR UR: ABNORMAL
CREAT SERPL-MCNC: 1.03 MG/DL (ref 0.6–1.3)
EOSINOPHIL # BLD AUTO: 0.26 THOUSAND/ΜL (ref 0–0.61)
EOSINOPHIL NFR BLD AUTO: 3 % (ref 0–6)
ERYTHROCYTE [DISTWIDTH] IN BLOOD BY AUTOMATED COUNT: 12.5 % (ref 11.6–15.1)
EXT PREGNANCY TEST URINE: NEGATIVE
EXT. CONTROL: NORMAL
GFR SERPL CREATININE-BSD FRML MDRD: 63 ML/MIN/1.73SQ M
GLUCOSE SERPL-MCNC: 100 MG/DL (ref 65–140)
GLUCOSE UR STRIP-MCNC: NEGATIVE MG/DL
HCT VFR BLD AUTO: 49.1 % (ref 34.8–46.1)
HGB BLD-MCNC: 17 G/DL (ref 11.5–15.4)
HGB UR QL STRIP.AUTO: ABNORMAL
IMM GRANULOCYTES # BLD AUTO: 0.03 THOUSAND/UL (ref 0–0.2)
IMM GRANULOCYTES NFR BLD AUTO: 0 % (ref 0–2)
INR PPP: 0.87 (ref 0.85–1.19)
KETONES UR STRIP-MCNC: NEGATIVE MG/DL
LACTATE SERPL-SCNC: 1.2 MMOL/L (ref 0.5–2)
LEUKOCYTE ESTERASE UR QL STRIP: ABNORMAL
LIPASE SERPL-CCNC: 35 U/L (ref 11–82)
LYMPHOCYTES # BLD AUTO: 2.08 THOUSANDS/ΜL (ref 0.6–4.47)
LYMPHOCYTES NFR BLD AUTO: 26 % (ref 14–44)
MCH RBC QN AUTO: 29.4 PG (ref 26.8–34.3)
MCHC RBC AUTO-ENTMCNC: 34.6 G/DL (ref 31.4–37.4)
MCV RBC AUTO: 85 FL (ref 82–98)
MONOCYTES # BLD AUTO: 0.64 THOUSAND/ΜL (ref 0.17–1.22)
MONOCYTES NFR BLD AUTO: 8 % (ref 4–12)
NEUTROPHILS # BLD AUTO: 4.79 THOUSANDS/ΜL (ref 1.85–7.62)
NEUTS SEG NFR BLD AUTO: 62 % (ref 43–75)
NITRITE UR QL STRIP: NEGATIVE
NON-SQ EPI CELLS URNS QL MICRO: ABNORMAL /HPF
NRBC BLD AUTO-RTO: 0 /100 WBCS
PH UR STRIP.AUTO: 7 [PH]
PLATELET # BLD AUTO: 264 THOUSANDS/UL (ref 149–390)
PMV BLD AUTO: 9.8 FL (ref 8.9–12.7)
POTASSIUM SERPL-SCNC: 4.8 MMOL/L (ref 3.5–5.3)
PROT SERPL-MCNC: 8.1 G/DL (ref 6.4–8.4)
PROT UR STRIP-MCNC: ABNORMAL MG/DL
PROTHROMBIN TIME: 12.3 SECONDS (ref 12.3–15)
RBC # BLD AUTO: 5.78 MILLION/UL (ref 3.81–5.12)
RBC #/AREA URNS AUTO: ABNORMAL /HPF
SODIUM SERPL-SCNC: 136 MMOL/L (ref 135–147)
SP GR UR STRIP.AUTO: 1.02 (ref 1–1.03)
UROBILINOGEN UR QL STRIP.AUTO: 1 E.U./DL
WBC # BLD AUTO: 7.88 THOUSAND/UL (ref 4.31–10.16)
WBC #/AREA URNS AUTO: ABNORMAL /HPF

## 2024-12-31 PROCEDURE — 83690 ASSAY OF LIPASE: CPT | Performed by: EMERGENCY MEDICINE

## 2024-12-31 PROCEDURE — 81025 URINE PREGNANCY TEST: CPT

## 2024-12-31 PROCEDURE — 80053 COMPREHEN METABOLIC PANEL: CPT | Performed by: EMERGENCY MEDICINE

## 2024-12-31 PROCEDURE — 74176 CT ABD & PELVIS W/O CONTRAST: CPT

## 2024-12-31 PROCEDURE — 85025 COMPLETE CBC W/AUTO DIFF WBC: CPT | Performed by: EMERGENCY MEDICINE

## 2024-12-31 PROCEDURE — 96361 HYDRATE IV INFUSION ADD-ON: CPT

## 2024-12-31 PROCEDURE — 87086 URINE CULTURE/COLONY COUNT: CPT | Performed by: UROLOGY

## 2024-12-31 PROCEDURE — 96374 THER/PROPH/DIAG INJ IV PUSH: CPT

## 2024-12-31 PROCEDURE — 96375 TX/PRO/DX INJ NEW DRUG ADDON: CPT

## 2024-12-31 PROCEDURE — 85610 PROTHROMBIN TIME: CPT | Performed by: EMERGENCY MEDICINE

## 2024-12-31 PROCEDURE — 36415 COLL VENOUS BLD VENIPUNCTURE: CPT | Performed by: EMERGENCY MEDICINE

## 2024-12-31 PROCEDURE — 81001 URINALYSIS AUTO W/SCOPE: CPT | Performed by: EMERGENCY MEDICINE

## 2024-12-31 PROCEDURE — 85730 THROMBOPLASTIN TIME PARTIAL: CPT | Performed by: EMERGENCY MEDICINE

## 2024-12-31 PROCEDURE — 83605 ASSAY OF LACTIC ACID: CPT | Performed by: EMERGENCY MEDICINE

## 2024-12-31 RX ORDER — TAMSULOSIN HYDROCHLORIDE 0.4 MG/1
0.4 CAPSULE ORAL
Qty: 10 CAPSULE | Refills: 0 | Status: SHIPPED | OUTPATIENT
Start: 2024-12-31 | End: 2025-01-10

## 2024-12-31 RX ORDER — TAMSULOSIN HYDROCHLORIDE 0.4 MG/1
0.4 CAPSULE ORAL ONCE
Status: COMPLETED | OUTPATIENT
Start: 2024-12-31 | End: 2024-12-31

## 2024-12-31 RX ORDER — OXYCODONE AND ACETAMINOPHEN 5; 325 MG/1; MG/1
1 TABLET ORAL EVERY 4 HOURS PRN
Qty: 12 TABLET | Refills: 0 | Status: SHIPPED | OUTPATIENT
Start: 2024-12-31

## 2024-12-31 RX ORDER — KETOROLAC TROMETHAMINE 30 MG/ML
30 INJECTION, SOLUTION INTRAMUSCULAR; INTRAVENOUS ONCE
Status: COMPLETED | OUTPATIENT
Start: 2024-12-31 | End: 2024-12-31

## 2024-12-31 RX ORDER — NITROFURANTOIN MACROCRYSTALS 100 MG/1
100 CAPSULE ORAL
Qty: 20 CAPSULE | Refills: 0 | Status: SHIPPED | OUTPATIENT
Start: 2024-12-31 | End: 2025-01-10 | Stop reason: SDUPTHER

## 2024-12-31 RX ORDER — GENTAMICIN 40 MG/ML
1.5 INJECTION, SOLUTION INTRAMUSCULAR; INTRAVENOUS ONCE
OUTPATIENT
Start: 2024-12-31

## 2024-12-31 RX ORDER — PHENAZOPYRIDINE HYDROCHLORIDE 200 MG/1
200 TABLET, FILM COATED ORAL
Qty: 20 TABLET | Refills: 0 | Status: SHIPPED | OUTPATIENT
Start: 2024-12-31 | End: 2025-01-10 | Stop reason: SDUPTHER

## 2024-12-31 RX ADMIN — TAMSULOSIN HYDROCHLORIDE 0.4 MG: 0.4 CAPSULE ORAL at 02:30

## 2024-12-31 RX ADMIN — KETOROLAC TROMETHAMINE 30 MG: 30 INJECTION, SOLUTION INTRAMUSCULAR; INTRAVENOUS at 00:33

## 2024-12-31 RX ADMIN — SODIUM CHLORIDE 1000 ML: 0.9 INJECTION, SOLUTION INTRAVENOUS at 00:27

## 2024-12-31 RX ADMIN — ONDANSETRON 4 MG: 2 INJECTION INTRAMUSCULAR; INTRAVENOUS at 00:27

## 2024-12-31 NOTE — TELEPHONE ENCOUNTER
Pt called in due to having re occurring notifications in regards to a referral placed to Dr. Hansen in which has the pt confused being that she was told a referral was to be placed out to [facility] and that our office will reach out to the pt. Please review and confirm once appointment with [facility] has been made for pt.    Cb: [phone]

## 2024-12-31 NOTE — PROGRESS NOTES
UROLOGY PROGRESS NOTE         NAME: Brandi Mccrary  AGE: 49 y.o. SEX: female  : 1975   MRN: [id number]    DATE: 2024  TIME: 9:15 AM    Assessment and Plan      Impression:   1. Ureteral calculi       Plan: The patient has complicated right upper urinary tract stone disease with an associated chronic, significant right ureteral stricture. Patient was in the ER last night with likely an obstructed stent and right flank pain.  This resolved overnight.  She is feeling much better today.  She is status post right ureteroscopy attempts at dilation of stricture in an effort to access her upper right ureteral stone.  We were not able to access the stone due to the stricture and a stent was placed.  She has a right ureteral stricture overlying the iliac vessels this is chronic in nature and was known to her previously.  I suspect the diameter of the stricture is the same as the diameter of the stent and thus when it became plugged she developed pain.  There is also a 2 to 3 mm stone more proximal in the upper dilated ureter adjacent to the stent.  Last night's CT scan was reviewed with she and her fiancé.  I have reviewed options with them.  The underlying stricture is an issue and may preclude us from being able to access the stone ureteroscopically.  Typically stricture such as this will soften and allow dilation after stent has been in place for about 2 weeks.  I offered her a second opinion at [facility] or another center of her choice.  She wants to be put on the schedule for right ureteroscopy with dilation of the stricture and laser lithotripsy of the stone along with stent placement.  We placed an external referral to [facility] regarding her history of stone disease in the face of a stricture which will likely continue to be an issue in the future.  I offered to send her there prior to doing any additional procedures for their opinion.  We  are going to try to get her on the schedule there as soon as possible for visit.      Her stent is in good position by the CT scan and this morning based on's lack of symptoms appears to be functioning properly.  She wants me to proceed with attempts at dilation of the stricture and management of the ureteral stone and place the referral to [facility].  She understands a possibility of me not being able to access stone as well as the possibility of becoming reobstructed by stricture.  All pertinent risks limitations outcomes and alternatives were reviewed and understood.  She understands that this will not be a long-term solution to her stricture or stone disease.  She and her fiancé's questions were answered.      Chief Complaint     Chief Complaint   Patient presents with    Cystoscopy     Cystoscopy stent removal. Patient seen at the ER yesterday for right lower abdominal pain. There is a stone noted to right side and hydronephrosis.     History of Present Illness     HPI: Brandi Mccrary is a 49 y.o. year old female who presents with right mid ureteral stricture and right mid ureteral calculus.  Patient has history of multiple ureteroscopy's on the right side.  Had cystoscopy with right ureteroscopy and right ureteral stent placement.  Postop day 5 please see op note.  Patient here for follow-up.  We plan repeat ureteroscopy and dilation of stricture after stent has been in place for about 2 weeks to allow ureter to passively dilate.  Also requested follow-up CT scan to determine whether or not stone persists..              The following portions of the patient's history were reviewed and updated as appropriate: allergies, current medications, past family history, past medical history, past social history, past surgical history and problem list.  Past Medical History:   Diagnosis Date    Anesthesia complication     difficulty awakening    Anxiety     Arthritis     Back pain     BRCA  "gene mutation negative     Disease of thyroid gland     hypo    Endometriosis     High cholesterol     Hypercholesterolemia     Hypothyroidism     Kidney stones     Migraine     Ovarian cyst     Sleep apnea     positional - no CPAP    Stress incontinence      Past Surgical History:   Procedure Laterality Date    HYSTERECTOMY      KIDNEY SURGERY      lithotripsy    OTHER SURGICAL HISTORY      Endometriosis surgery    [place] CYSTO/URETERO W/LITHOTRIPSY &INDWELL STENT INSRT Right 12/26/2024    Procedure: CYSTOSCOPY, URETEROSCOPY, RETROGRADE PYELOGRAM AND INSERTION STENT URETERAL;  Surgeon: Mitchell Hansen MD;  Location:  MAIN OR;  Service: Urology    WISDOM TOOTH EXTRACTION       shoulder  Review of Systems     Const: Denies chills, fever and weight loss.  CV: Denies chest pain.  Resp: Denies SOB.  GI: Denies abdominal pain, nausea and vomiting.  : Denies symptoms other than stated above.  Musculo: Denies back pain.    Objective   /78   Pulse 105   Temp 97.8 °F (36.6 °C)   Ht 5' 3\" (1.6 m)   Wt 73 kg (161 lb)   SpO2 96%   BMI 28.52 kg/m²     Physical Exam  Const: Appears healthy and well developed. No signs of acute distress present.  Resp: Respirations are regular and unlabored.   CV: Rate is regular. Rhythm is regular.  Abdomen: Abdomen is soft, nontender, and nondistended. Kidneys are not palpable.  : Not performed  Psych: Patient's attitude is cooperative. Mood is normal. Affect is normal.    Procedure   Procedures     Current Medications     Current Outpatient Medications:     escitalopram (LEXAPRO) 20 mg tablet, Take 20 mg by mouth daily, Disp: , Rfl:     estradiol (CLIMARA) 0.1 mg/24 hr, Place 1 patch on the skin once a week, Disp: , Rfl:     levothyroxine 100 mcg tablet, Take 100 mcg by mouth daily, Disp: , Rfl:     Multiple Vitamins-Minerals (MULTIVITAL-M PO), Take by mouth, Disp: , Rfl:     Naproxen Sodium (ALEVE PO), Take by mouth if needed, Disp: , Rfl:     nitrofurantoin (MACROBID) 100 mg " capsule, Take 1 capsule (100 mg total) by mouth 2 (two) times a day, Disp: 14 capsule, Rfl: 0    ondansetron (ZOFRAN-ODT) 4 mg disintegrating tablet, take 1 tablet by mouth every 8 hours as needed for nausea and vomiting, Disp: , Rfl:     oxybutynin (DITROPAN-XL) 10 MG 24 hr tablet, Take 1 tablet (10 mg total) by mouth daily at bedtime, Disp: 90 tablet, Rfl: 3    phenazopyridine (PYRIDIUM) 200 mg tablet, Take 1 tablet (200 mg total) by mouth 3 (three) times a day with meals, Disp: 10 tablet, Rfl: 0    rizatriptan (MAXALT) 10 mg tablet, Take 10 mg by mouth as needed for migraine Take at the onset of migraine; if symptoms continue or return, may take another dose at least 2 hours after first dose. Take no more than 2 doses in a day., Disp: , Rfl:     rosuvastatin (CRESTOR) 10 MG tablet, Take 10 mg by mouth daily, Disp: , Rfl:     tamsulosin (FLOMAX) 0.4 mg, Take 1 capsule (0.4 mg total) by mouth daily with dinner for 10 days, Disp: 10 capsule, Rfl: 0    oxyCODONE-acetaminophen (PERCOCET) 5-325 mg per tablet, Take 1 tablet by mouth every 4 (four) hours as needed for severe pain for up to 12 doses Max Daily Amount: 6 tablets (Patient not taking: Reported on 12/31/2024), Disp: 12 tablet, Rfl: 0  No current facility-administered medications for this visit.        Feliz Hansen MD

## 2024-12-31 NOTE — ED PROVIDER NOTES
Time reflects when diagnosis was documented in both MDM as applicable and the Disposition within this note       Time User Action Codes Description Comment    12/31/2024  2:14 AM Yamil Gomez Add [R10.9] Abdominal pain     12/31/2024  2:14 AM Yamil Gomez Add [N13.30] Hydronephrosis, right     12/31/2024  2:14 AM Yamil Gomez Add [N20.1] Ureterolithiasis     12/31/2024  2:15 AM Yamil Gomez Add [Z96.0] Ureteral stent present           ED Disposition       ED Disposition   Discharge    Condition   Stable    Date/Time   Tue Dec 31, 2024  2:14 AM    Comment   Brandi Mccrary discharge to home/self care.                   Assessment & Plan       Medical Decision Making  Differential diagnosis included but not limited to urinary tract hydronephrosis pyelonephritis ureteral stent malfunction.  Patient remained clinically and hemodynamically stable in the emergency department she is afebrile nontoxic well-appearing felt improved after rest fluids and meds given the emergency department Case was discussed and reviewed with urology for now recommending oral pain control with NSAIDs and oral analgesics and Flomax and supportive care and prompt follow-up with urology outpatient for further evaluation and management. return precautions and anticipatory guidance discussed.      Problems Addressed:  Abdominal pain: acute illness or injury  Hydronephrosis, right: acute illness or injury  Ureteral stent present: acute illness or injury  Ureterolithiasis: acute illness or injury    Amount and/or Complexity of Data Reviewed  Labs: ordered. Decision-making details documented in ED Course.  Radiology: ordered. Decision-making details documented in ED Course.    Risk  Prescription drug management.        ED Course as of 12/31/24 0221   Tue Dec 31, 2024   0218 Spoke with urology advanced practitioner on-call Amaris reviewed case and findings in the emergency department and management thus far advised continue patient on Flomax and  oral analgesics with NSAID and stool softener and discharge with outpatient urology follow-up patient comfortable in agreement with this plan.         Medications   morphine injection 4 mg (4 mg Intravenous Not Given 12/31/24 0000)   tamsulosin (FLOMAX) capsule 0.4 mg (has no administration in time range)   sodium chloride 0.9 % bolus 1,000 mL (0 mL Intravenous Stopped 12/31/24 0220)   ondansetron (ZOFRAN) injection 4 mg (4 mg Intravenous Given 12/31/24 0027)   ketorolac (TORADOL) injection 30 mg (30 mg Intravenous Given 12/31/24 0033)       ED Risk Strat Scores                                              History of Present Illness       Chief Complaint   Patient presents with    Abdominal Pain     Pt had a cystoscopy a few days ago for an obstructing stone in her right ureter. States they weren't able to remove the stone but they did place a stent. Pt started with severe right lower abdominal pain a few hours ago and        Past Medical History:   Diagnosis Date    Anesthesia complication     difficulty awakening    Anxiety     Arthritis     Back pain     BRCA gene mutation negative     Disease of thyroid gland     hypo    Endometriosis     High cholesterol     Hypercholesterolemia     Hypothyroidism     Kidney stones     Migraine     Ovarian cyst     Sleep apnea     positional - no CPAP    Stress incontinence       Past Surgical History:   Procedure Laterality Date    HYSTERECTOMY      KIDNEY SURGERY      lithotripsy    OTHER SURGICAL HISTORY      Endometriosis surgery    [facility] CYSTO/URETERO W/LITHOTRIPSY &INDWELL STENT INSRT Right 12/26/2024    Procedure: CYSTOSCOPY, URETEROSCOPY, RETROGRADE PYELOGRAM AND INSERTION STENT URETERAL;  Surgeon: Mitchell Hansen MD;  Location:  MAIN OR;  Service: Urology    WISDOM TOOTH EXTRACTION        Family History   Problem Relation Age of Onset    Heart disease Mother     Cancer Mother     Diabetes Father     Diabetes Maternal Grandmother       Social History     Tobacco Use     Smoking status: Never    Smokeless tobacco: Never   Vaping Use    Vaping status: Never Used   Substance Use Topics    Alcohol use: Never    Drug use: Not Currently      E-Cigarette/Vaping    E-Cigarette Use Never User       E-Cigarette/Vaping Substances    Nicotine No     THC No     CBD No     Flavoring No     Other No     Unknown No       I have reviewed and agree with the history as documented.     Patient is a 49-year-old female history of endometriosis ovarian cysts kidney stone hyperlipidemia status post recent cystoscopy and ureteral stent placement on the right.  Patient presents the emergency department complaint of right lower abdominal pain associate with nausea and vomiting started few hours ago denies any fevers or chills no difficulty urinating or blood in the urine or dysuria.        History provided by:  Patient  Abdominal Pain  Pain location:  RLQ  Pain quality: aching and cramping    Associated symptoms: dysuria, nausea and vomiting    Associated symptoms: no chest pain, no diarrhea, no fever, no hematuria and no shortness of breath        Review of Systems   Constitutional:  Negative for fever.   Respiratory:  Negative for shortness of breath.    Cardiovascular:  Negative for chest pain.   Gastrointestinal:  Positive for abdominal pain, nausea and vomiting. Negative for diarrhea.   Genitourinary:  Positive for dysuria and flank pain. Negative for difficulty urinating and hematuria.   All other systems reviewed and are negative.          Objective       ED Triage Vitals   Temperature Pulse Blood Pressure Respirations SpO2 Patient Position - Orthostatic VS   12/30/24 2349 12/30/24 2347 12/30/24 2347 12/30/24 2347 12/30/24 2347 12/30/24 2347   98 °F (36.7 °C) 79 141/71 20 98 % Sitting      Temp Source Heart Rate Source BP Location FiO2 (%) Pain Score    12/30/24 2349 12/30/24 2347 12/30/24 2347 -- 12/30/24 2347    Temporal Monitor Right arm  8      Vitals      Date and Time Temp Pulse SpO2 Resp BP  Pain Score FACES Pain Rating User   12/31/24 0145 -- 84 98 % 18 148/73 -- -- MD   12/31/24 0033 -- -- -- -- -- 10 - Worst Possible Pain -- MD   12/30/24 2349 98 °F (36.7 °C) -- -- -- -- -- --    12/30/24 2347 -- 79 98 % 20 141/71 8 --             Physical Exam  Vitals and nursing note reviewed.   Constitutional:       General: She is not in acute distress.     Appearance: Normal appearance.   HENT:      Head: Normocephalic and atraumatic.      Nose: Nose normal.   Eyes:      Conjunctiva/sclera: Conjunctivae normal.   Pulmonary:      Effort: Pulmonary effort is normal. No respiratory distress.   Abdominal:      Tenderness: There is abdominal tenderness in the right lower quadrant. There is no right CVA tenderness, guarding or rebound.   Skin:     General: Skin is dry.   Neurological:      General: No focal deficit present.      Mental Status: She is alert and oriented to person, place, and time.         Results Reviewed       Procedure Component Value Units Date/Time    Comprehensive metabolic panel [[id number]] Collected: 12/31/24 0027    Lab Status: Final result Specimen: Blood from Arm, Left Updated: 12/31/24 0114     Sodium 136 mmol/L      Potassium 4.8 mmol/L      Chloride 101 mmol/L      CO2 29 mmol/L      ANION GAP 6 mmol/L      BUN 14 mg/dL      Creatinine 1.03 mg/dL      Glucose 100 mg/dL      Calcium 9.5 mg/dL      AST 27 U/L      ALT 25 U/L      Alkaline Phosphatase 69 U/L      Total Protein 8.1 g/dL      Albumin 4.9 g/dL      Total Bilirubin 0.74 mg/dL      eGFR 63 ml/min/1.73sq m     Narrative:      National Kidney Disease Foundation guidelines for Chronic Kidney Disease (CKD):     Stage 1 with normal or high GFR (GFR > 90 mL/min/1.73 square meters)    Stage 2 Mild CKD (GFR = 60-89 mL/min/1.73 square meters)    Stage 3A Moderate CKD (GFR = 45-59 mL/min/1.73 square meters)    Stage 3B Moderate CKD (GFR = 30-44 mL/min/1.73 square meters)    Stage 4 Severe CKD (GFR = 15-29 mL/min/1.73 square meters)     Stage 5 End Stage CKD (GFR <15 mL/min/1.73 square meters)  Note: GFR calculation is accurate only with a steady state creatinine    Lipase [[id number]]  (Normal) Collected: 12/31/24 0027    Lab Status: Final result Specimen: Blood from Arm, Left Updated: 12/31/24 0114     Lipase 35 u/L     Urine Microscopic [[id number]]  (Abnormal) Collected: 12/31/24 0028    Lab Status: Final result Specimen: Urine, Clean Catch Updated: 12/31/24 0106     RBC, UA 4-10 /hpf      WBC, UA 4-10 /hpf      Epithelial Cells Occasional /hpf      Bacteria, UA Occasional /hpf     Lactic acid, plasma (w/reflex if result > 2.0) [[id number]]  (Normal) Collected: 12/31/24 0027    Lab Status: Final result Specimen: Blood from Arm, Left Updated: 12/31/24 0105     LACTIC ACID 1.2 mmol/L     Narrative:      Result may be elevated if tourniquet was used during collection.    Protime-INR [[id number]]  (Normal) Collected: 12/31/24 0027    Lab Status: Final result Specimen: Blood from Arm, Left Updated: 12/31/24 0057     Protime 12.3 seconds      INR 0.87    Narrative:      INR Therapeutic Range    Indication                                             INR Range      Atrial Fibrillation                                               2.0-3.0  Hypercoagulable State                                    2.0.2.3  Left Ventricular Asist Device                            2.0-3.0  Mechanical Heart Valve                                  -    Aortic(with afib, MI, embolism, HF, LA enlargement,    and/or coagulopathy)                                     2.0-3.0 (2.5-3.5)     Mitral                                                             2.5-3.5  Prosthetic/Bioprosthetic Heart Valve               2.0-3.0  Venous thromboembolism (VTE: VT, PE        2.0-3.0    APTT [[id number]]  (Normal) Collected: 12/31/24 0027    Lab Status: Final result Specimen: Blood from Arm, Left Updated: 12/31/24 0057     PTT 25 seconds     UA w Reflex to Microscopic w Reflex to Culture  [[id number]]  (Abnormal) Collected: 12/31/24 0028    Lab Status: Final result Specimen: Urine, Clean Catch Updated: 12/31/24 0049     Color, UA Della     Clarity, UA Clear     Specific Gravity, UA 1.020     pH, UA 7.0     Leukocytes, UA Moderate     Nitrite, UA Negative     Protein,  (2+) mg/dl      Glucose, UA Negative mg/dl      Ketones, UA Negative mg/dl      Urobilinogen, UA 1.0 E.U./dl      Bilirubin, UA Negative     Occult Blood, UA Large    CBC and differential [[id number]]  (Abnormal) Collected: 12/31/24 0027    Lab Status: Final result Specimen: Blood from Arm, Left Updated: 12/31/24 0046     WBC 7.88 Thousand/uL      RBC 5.78 Million/uL      Hemoglobin 17.0 g/dL      Hematocrit 49.1 %      MCV 85 fL      MCH 29.4 pg      MCHC 34.6 g/dL      RDW 12.5 %      MPV 9.8 fL      Platelets 264 Thousands/uL      nRBC 0 /100 WBCs      Segmented % 62 %      Immature Grans % 0 %      Lymphocytes % 26 %      Monocytes % 8 %      Eosinophils Relative 3 %      Basophils Relative 1 %      Absolute Neutrophils 4.79 Thousands/µL      Absolute Immature Grans 0.03 Thousand/uL      Absolute Lymphocytes 2.08 Thousands/µL      Absolute Monocytes 0.64 Thousand/µL      Eosinophils Absolute 0.26 Thousand/µL      Basophils Absolute 0.08 Thousands/µL     POCT pregnancy, urine [[id number]]  (Normal) Collected: 12/31/24 0030    Lab Status: Final result Updated: 12/31/24 0033     EXT Preg Test, Ur Negative     Control Valid            CT abdomen pelvis wo contrast   Final Interpretation by Frank Rojas MD (12/31 0139)      Right ureteral stent in appropriate positioning, however there is worsening right hydronephrosis. Ureteral stone is present, slightly more proximal in location.      Workstation performed: [id number]             Procedures    ED Medication and Procedure Management   Prior to Admission Medications   Prescriptions Last Dose Informant Patient Reported? Taking?   Multiple Vitamins-Minerals (MULTIVITAL-M PO)   Yes No    Sig: Take by mouth   Naproxen Sodium (ALEVE PO)   Yes No   Sig: Take by mouth   escitalopram (LEXAPRO) 20 mg tablet   Yes No   Sig: Take 20 mg by mouth daily   estradiol (CLIMARA) 0.1 mg/24 hr   Yes No   Sig: Place 1 patch on the skin once a week   levothyroxine 100 mcg tablet   Yes No   Sig: Take 100 mcg by mouth daily   nitrofurantoin (MACROBID) 100 mg capsule   No No   Sig: Take 1 capsule (100 mg total) by mouth 2 (two) times a day   ondansetron (ZOFRAN-ODT) 4 mg disintegrating tablet   Yes No   Sig: take 1 tablet by mouth every 8 hours as needed for nausea and vomiting   oxybutynin (DITROPAN-XL) 10 MG 24 hr tablet   No No   Sig: Take 1 tablet (10 mg total) by mouth daily at bedtime   phenazopyridine (PYRIDIUM) 200 mg tablet   No No   Sig: Take 1 tablet (200 mg total) by mouth 3 (three) times a day with meals   rizatriptan (MAXALT) 10 mg tablet   Yes No   Sig: Take 10 mg by mouth as needed for migraine Take at the onset of migraine; if symptoms continue or return, may take another dose at least 2 hours after first dose. Take no more than 2 doses in a day.   rosuvastatin (CRESTOR) 10 MG tablet   Yes No   Sig: Take 10 mg by mouth daily   tamsulosin (FLOMAX) 0.4 mg   Yes No   Sig: Take 1 capsule by mouth in the morning      Facility-Administered Medications: None     Patient's Medications   Discharge Prescriptions    OXYCODONE-ACETAMINOPHEN (PERCOCET) 5-325 MG PER TABLET    Take 1 tablet by mouth every 4 (four) hours as needed for severe pain for up to 12 doses Max Daily Amount: 6 tablets       Start Date: 12/31/2024End Date: --       Order Dose: 1 tablet       Quantity: 12 tablet    Refills: 0    TAMSULOSIN (FLOMAX) 0.4 MG    Take 1 capsule (0.4 mg total) by mouth daily with dinner for 10 days       Start Date: 12/31/2024End Date: 1/10/2025       Order Dose: 0.4 mg       Quantity: 10 capsule    Refills: 0     No discharge procedures on file.  ED SEPSIS DOCUMENTATION   Time reflects when diagnosis was documented  in both MDM as applicable and the Disposition within this note       Time User Action Codes Description Comment    12/31/2024  2:14 AM Yamil Gomez [R10.9] Abdominal pain     12/31/2024  2:14 AM Yamil Gomez [N13.30] Hydronephrosis, right     12/31/2024  2:14 AM Yamil Gomez [N20.1] Ureterolithiasis     12/31/2024  2:15 AM Yamil Gomez [Z96.0] Ureteral stent present                  Yamil Gomez DO  12/31/24 0221

## 2024-12-31 NOTE — H&P
UROLOGY PROGRESS NOTE         NAME: Brandi Mccrary  AGE: 49 y.o. SEX: female  : 1975   MRN: [id number]    DATE: 2024  TIME: 9:15 AM    Assessment and Plan      Impression:   1. Ureteral calculi       Plan: The patient has complicated right upper urinary tract stone disease with an associated chronic, significant right ureteral stricture. Patient was in the ER last night with likely an obstructed stent and right flank pain.  This resolved overnight.  She is feeling much better today.  She is status post right ureteroscopy attempts at dilation of stricture in an effort to access her upper right ureteral stone.  We were not able to access the stone due to the stricture and a stent was placed.  She has a right ureteral stricture overlying the iliac vessels this is chronic in nature and was known to her previously.  I suspect the diameter of the stricture is the same as the diameter of the stent and thus when it became plugged she developed pain.  There is also a 2 to 3 mm stone more proximal in the upper dilated ureter adjacent to the stent.  Last night's CT scan was reviewed with she and her fiancé.  I have reviewed options with them.  The underlying stricture is an issue and may preclude us from being able to access the stone ureteroscopically.  Typically stricture such as this will soften and allow dilation after stent has been in place for about 2 weeks.  I offered her a second opinion at [facility] or another center of her choice.  She wants to be put on the schedule for right ureteroscopy with dilation of the stricture and laser lithotripsy of the stone along with stent placement.  We placed an external referral to [facility] regarding her history of stone disease in the face of a stricture which will likely continue to be an issue in the future.  I offered to send her there prior to doing any additional procedures for their opinion.  We  are going to try to get her on the schedule there as soon as possible for visit.      Her stent is in good position by the CT scan and this morning based on's lack of symptoms appears to be functioning properly.  She wants me to proceed with attempts at dilation of the stricture and management of the ureteral stone and place the referral to [facility].  She understands a possibility of me not being able to access stone as well as the possibility of becoming reobstructed by stricture.  All pertinent risks limitations outcomes and alternatives were reviewed and understood.  She understands that this will not be a long-term solution to her stricture or stone disease.  She and her fiancé's questions were answered.      Chief Complaint     Chief Complaint   Patient presents with    Cystoscopy     Cystoscopy stent removal. Patient seen at the ER yesterday for right lower abdominal pain. There is a stone noted to right side and hydronephrosis.     History of Present Illness     HPI: Brandi Mccrary is a 49 y.o. year old female who presents with right mid ureteral stricture and right mid ureteral calculus.  Patient has history of multiple ureteroscopy's on the right side.  Had cystoscopy with right ureteroscopy and right ureteral stent placement.  Postop day 5 please see op note.  Patient here for follow-up.  We plan repeat ureteroscopy and dilation of stricture after stent has been in place for about 2 weeks to allow ureter to passively dilate.  Also requested follow-up CT scan to determine whether or not stone persists..              The following portions of the patient's history were reviewed and updated as appropriate: allergies, current medications, past family history, past medical history, past social history, past surgical history and problem list.  Past Medical History:   Diagnosis Date    Anesthesia complication     difficulty awakening    Anxiety     Arthritis     Back pain     BRCA  "gene mutation negative     Disease of thyroid gland     hypo    Endometriosis     High cholesterol     Hypercholesterolemia     Hypothyroidism     Kidney stones     Migraine     Ovarian cyst     Sleep apnea     positional - no CPAP    Stress incontinence      Past Surgical History:   Procedure Laterality Date    HYSTERECTOMY      KIDNEY SURGERY      lithotripsy    OTHER SURGICAL HISTORY      Endometriosis surgery    SD CYSTO/URETERO W/LITHOTRIPSY &INDWELL STENT INSRT Right 12/26/2024    Procedure: CYSTOSCOPY, URETEROSCOPY, RETROGRADE PYELOGRAM AND INSERTION STENT URETERAL;  Surgeon: Mitchell Hansen MD;  Location:  MAIN OR;  Service: Urology    WISDOM TOOTH EXTRACTION       shoulder  Review of Systems     Const: Denies chills, fever and weight loss.  CV: Denies chest pain.  Resp: Denies SOB.  GI: Denies abdominal pain, nausea and vomiting.  : Denies symptoms other than stated above.  Musculo: Denies back pain.    Objective   /78   Pulse 105   Temp 97.8 °F (36.6 °C)   Ht 5' 3\" (1.6 m)   Wt 73 kg (161 lb)   SpO2 96%   BMI 28.52 kg/m²     Physical Exam  Const: Appears healthy and well developed. No signs of acute distress present.  Resp: Respirations are regular and unlabored.   CV: Rate is regular. Rhythm is regular.  Abdomen: Abdomen is soft, nontender, and nondistended. Kidneys are not palpable.  : Not performed  Psych: Patient's attitude is cooperative. Mood is normal. Affect is normal.    Procedure   Procedures     Current Medications     Current Outpatient Medications:     escitalopram (LEXAPRO) 20 mg tablet, Take 20 mg by mouth daily, Disp: , Rfl:     estradiol (CLIMARA) 0.1 mg/24 hr, Place 1 patch on the skin once a week, Disp: , Rfl:     levothyroxine 100 mcg tablet, Take 100 mcg by mouth daily, Disp: , Rfl:     Multiple Vitamins-Minerals (MULTIVITAL-M PO), Take by mouth, Disp: , Rfl:     Naproxen Sodium (ALEVE PO), Take by mouth if needed, Disp: , Rfl:     nitrofurantoin (MACROBID) 100 mg " capsule, Take 1 capsule (100 mg total) by mouth 2 (two) times a day, Disp: 14 capsule, Rfl: 0    ondansetron (ZOFRAN-ODT) 4 mg disintegrating tablet, take 1 tablet by mouth every 8 hours as needed for nausea and vomiting, Disp: , Rfl:     oxybutynin (DITROPAN-XL) 10 MG 24 hr tablet, Take 1 tablet (10 mg total) by mouth daily at bedtime, Disp: 90 tablet, Rfl: 3    phenazopyridine (PYRIDIUM) 200 mg tablet, Take 1 tablet (200 mg total) by mouth 3 (three) times a day with meals, Disp: 10 tablet, Rfl: 0    rizatriptan (MAXALT) 10 mg tablet, Take 10 mg by mouth as needed for migraine Take at the onset of migraine; if symptoms continue or return, may take another dose at least 2 hours after first dose. Take no more than 2 doses in a day., Disp: , Rfl:     rosuvastatin (CRESTOR) 10 MG tablet, Take 10 mg by mouth daily, Disp: , Rfl:     tamsulosin (FLOMAX) 0.4 mg, Take 1 capsule (0.4 mg total) by mouth daily with dinner for 10 days, Disp: 10 capsule, Rfl: 0    oxyCODONE-acetaminophen (PERCOCET) 5-325 mg per tablet, Take 1 tablet by mouth every 4 (four) hours as needed for severe pain for up to 12 doses Max Daily Amount: 6 tablets (Patient not taking: Reported on 12/31/2024), Disp: 12 tablet, Rfl: 0  No current facility-administered medications for this visit.        Feliz Hansen MD

## 2025-01-02 ENCOUNTER — TELEPHONE (OUTPATIENT)
Dept: UROLOGY | Facility: CLINIC | Age: 50
End: 2025-01-02

## 2025-01-02 LAB — BACTERIA UR CULT: NORMAL

## 2025-01-02 NOTE — TELEPHONE ENCOUNTER
Pt called and stated she spoke with [facility] for scheduling assistance, upon trying to connect [facility] and  through care everywhere they were showing the pt opted out. Pt attempted to make the changes via  [employer] but was unable to do so and received a pop up to contact the office directly. Pt's care everywhere is enabled but showing she is not participating in receiving outside information. Pt's call was connected with the  Help Desk for further assistance.

## 2025-01-02 NOTE — TELEPHONE ENCOUNTER
Patient called stating she is feeling better after vomiting.  She stated she does not feel she needs to go to ER now.  He pain level went down to a 2.  She wants to know if she should continue taking the medication she is on.  She would like a call back from the office. She will keep the appointment on 01/16/25 at [place].  She wants to know how much water she should be drinking.  She does not want to overdue it since she is having issues with the hydronephrosis.  Please review and call her.     She can be reached at [phone]

## 2025-01-02 NOTE — TELEPHONE ENCOUNTER
"Patient finance , Jehovah's witness calling, he is taking her to the ED due to pain and vomiting.    Vomiting started 30 minutes ago,  pain is right flank and radiates to her abdomen. Pain level 7 out of 10    Appointment with [facility] on 1/16/25    They are asking if the Stent should be removed. He states \"the stent is causing more issues than the stone\"  Please advise    Patient on her way to [facility] ED  "

## 2025-01-02 NOTE — TELEPHONE ENCOUNTER
Left message for patient to see if she wanted us to schedule appointment with [facility] or if she would like for us to schedule. Awaiting return call. Will call and try and schedule patient with next available appointment at [facility] with Dr. Devaughn MD or any of his partners. Next available urgent appointment is January 30th at 2 pm. Arrival time is 1:45 pm. Called patient back and relayed all information to patient. Was unable to have patient placed on cancellation list due to patient is not signed up for the my-chart portal. Patient is going to call and set up portal to be placed on cancellation list. She is aware to get all imaging on a disc to take to er appointmet. Gave the address of office ([address] [place] [address], [address]) Phone # [phone].     Patient wanting to know what to do regarding stent is still in and appointment with Dr. Cantor is not until January 30th? What would be the next step?

## 2025-01-03 ENCOUNTER — TELEPHONE (OUTPATIENT)
Dept: UROLOGY | Facility: CLINIC | Age: 50
End: 2025-01-03

## 2025-01-03 ENCOUNTER — PREP FOR PROCEDURE (OUTPATIENT)
Dept: UROLOGY | Facility: CLINIC | Age: 50
End: 2025-01-03

## 2025-01-03 NOTE — TELEPHONE ENCOUNTER
Please let patient know guidelines recommend drinking enough water to produce 2.5Liters of urine daily.  We recognize this is extremely dificult to determine.  A good rule of thumb is to drink enough water to produce pale yellow, clear urine.  Adequate hydration is the best intervention she can employ when dealing with kidney stones.  It does not contribute to the hydronephrosis or kidney swelling.  Regarding the medication.  I am unclear which she is referring to.  I see a few ordered on the same day.  If she is referring to the pyridium or pain medication she does not need to take that if she feels as if she does not need it.  If she is referring to the antibiotic or macrobid, she should take that until it is all gone.  If she is referring to the flomax or tamsulosin, some people suggest that this be discontinued when there is no stone present.  However, some people believe this can help dilate the ureter and provide additional comfort in people with indwelling stents.  If she is using this and noticing relief or noting benefit, she can continue to use this medication.

## 2025-01-03 NOTE — TELEPHONE ENCOUNTER
Spoke with patient and confirmed surgery date of: 1/23  Type of surgery: right #5, dilation  Operating physician: Dr. Hansen  Location of surgery: [facility]    Verbally went over prep with patient on: 1/3  NPO  Bowel prep? No  Hospital calls afternoon prior with arrival time -Calls Friday afternoon for Monday surgeries  Patient needs ride to and from surgery (outpatient)   Pre-op testing to be done 2 weeks prior to surgery  (N/A)  Blood thinners: N/A  Clearances needed: (None)    Mailed to patient on: 1/3  Copy of packet scanned into Media on: 1/3  Labs in packet  Soap / Bowel prep in packet  Post-op in packet  Date 2/5    Consent: on admit

## 2025-01-03 NOTE — TELEPHONE ENCOUNTER
Pt notified and agreed.  States she is not having any pain.  Her urology appt got moved up to 1/16/2025.  She thanked us for this message.

## 2025-01-06 ENCOUNTER — RESULTS FOLLOW-UP (OUTPATIENT)
Dept: UROLOGY | Facility: CLINIC | Age: 50
End: 2025-01-06

## 2025-01-09 ENCOUNTER — TELEPHONE (OUTPATIENT)
Dept: UROLOGY | Facility: CLINIC | Age: 50
End: 2025-01-09

## 2025-01-09 DIAGNOSIS — N13.30 HYDRONEPHROSIS, RIGHT: ICD-10-CM

## 2025-01-09 NOTE — PROGRESS NOTES
On January 8, I reviewed the patient's case and imaging with Dr. D'Amico and Dr. Manzano.  They are in agreement that it would be in her best interest to receive treatment at a tertiary center such as [facility] where she has a visit on January 16 with Dr. Cantor.  I had placed that referral to [facility] at the time of her last visit.  She has complicated stone disease with pre-existing mid right ureteral stricture and multiple calculi proximal to this in the right ureter as well as right kidney.    She has a stent in place.  I did place a call to the patient yesterday to inform her of the outcome of my meeting with Dr. D'Amico and Jose Juan.  There was no answer.  We remain available here for local care.

## 2025-01-09 NOTE — TELEPHONE ENCOUNTER
Per Dr Hansen, lmom notifying pt she will need to have stone removal at other facility and to discuss with them at her upcoming appt on 1/16. Advised she can discuss further with Dr Hansen if she wishes.

## 2025-01-09 NOTE — TELEPHONE ENCOUNTER
Progress Notes  Mitchell Hansen MD (Physician)  Urology  On January 8, I reviewed the patient's case and imaging with Dr. D'Amico and Dr. Manzano.  They are in agreement that it would be in her best interest to receive treatment at a tertiary center such as [facility] where she has a visit on January 16 with Dr. Cantor.  I had placed that referral to [facility] at the time of her last visit.  She has complicated stone disease with pre-existing mid right ureteral stricture and multiple calculi proximal to this in the right ureter as well as right kidney.    She has a stent in place.  I did place a call to the patient yesterday to inform her of the outcome of my meeting with Dr. D'Amico and Jose Juan.  There was no answer.  We remain available here for local care.

## 2025-01-10 DIAGNOSIS — N20.1 URETERAL CALCULI: ICD-10-CM

## 2025-01-10 DIAGNOSIS — N13.5 URETERAL STRICTURE: ICD-10-CM

## 2025-01-10 RX ORDER — NITROFURANTOIN MACROCRYSTALS 100 MG/1
100 CAPSULE ORAL
Qty: 20 CAPSULE | Refills: 0 | Status: SHIPPED | OUTPATIENT
Start: 2025-01-10

## 2025-01-10 RX ORDER — PHENAZOPYRIDINE HYDROCHLORIDE 200 MG/1
200 TABLET, FILM COATED ORAL
Qty: 20 TABLET | Refills: 0 | Status: SHIPPED | OUTPATIENT
Start: 2025-01-10

## 2025-01-10 NOTE — TELEPHONE ENCOUNTER
Patient had called in earlier this afternoon requesting refills on Pyridium, Macrodantin and Flomax.  I sent refills on the Macrodantin and Pyridium to her pharmacy electronically.  She does not need to take the Flomax at this point.  I also tried calling her x2 but did not get an answer and the call did not go to voicemail.  Please try to let patient know that these meds were called in for her.  I am happy to answer any questions she might have.  She is scheduled at [facility] on January 16.  I also had reviewed her imaging with Dr. D'Amico and Dr. Manzano earlier this week who concurred with recommendations for her to be seen and treated at [facility].

## 2025-01-10 NOTE — TELEPHONE ENCOUNTER
Patient calling in to follow up on current prescriptions. Patient currently on Flomax, Pyridium and Macrodantin. She has appt with [facility] 1/16/25 and is down to 2 pills both Flomax and Pyridium and only 5 more of the ABX. She is inquiring if she should continue with these meds until following up with [facility]. If so would need Rx sent to pharmacy.     [facility] #[id number] - [place] - [address] [[id number]]    Also inquiring about records being sent to [facility] for upcoming appt.

## 2025-01-10 NOTE — TELEPHONE ENCOUNTER
Please see other encounter. Left message to make patient aware that medications were sent to the pharmacy for her. Also left message that if she wants to discuss with Dr. Hansen he will gladly speak with her.

## 2025-01-10 NOTE — TELEPHONE ENCOUNTER
Called patient and left message that Dr. Hansen sent the medication to the pharmacy for her. Also let her know that Dr. Hansen said that if she has any concerns to please call and he can talk with patient. Dr. Hansen did try calling but was unable to contact patient.

## 2025-01-15 NOTE — TELEPHONE ENCOUNTER
Patient calling as pharmacy did not receive flomax script. Please send.   Patient also inquired if she should keep her appointment for February for stent removal or if she needs to follow up for that at [facility]. Stets she is going there for her consult tomorrow, advised to discuss with YEVGENIY and let us know.

## 2025-01-16 RX ORDER — TAMSULOSIN HYDROCHLORIDE 0.4 MG/1
0.4 CAPSULE ORAL
Qty: 10 CAPSULE | Refills: 0 | Status: SHIPPED | OUTPATIENT
Start: 2025-01-16 | End: 2025-01-26

## 2025-01-24 ENCOUNTER — NURSE TRIAGE (OUTPATIENT)
Age: 50
End: 2025-01-24

## 2025-01-24 DIAGNOSIS — R31.0 GROSS HEMATURIA: Primary | ICD-10-CM

## 2025-01-24 DIAGNOSIS — N20.0 RENAL CALCULI: ICD-10-CM

## 2025-01-24 NOTE — TELEPHONE ENCOUNTER
Patient calling again inquiring if she should get a KUB. Advised that the provider has not responded yet and will call back once we get an answer    Pain in urethra and abdomen, pain level 3 out of 10. Denies incontinence, fever, chills, N/V, no blood clots. Urine is red. Did discuss some blood in the urine can be expected with a stent    ED precautions reviewed, encouraged to increase water 64 ounces daily     #[phone]

## 2025-01-24 NOTE — TELEPHONE ENCOUNTER
Patient called in d/t she has been having some cranberry juice hematuria the last few days. Some stent discomfort, feels this morning that she did not empty bladder with her morning urination. Patient has known stones that were not able to be removed with surgery d/t scar tissue, referred to [facility], and is following up with stone removal 2/10, has stent in place until then. Ordered urine testing. Patient is requesting imaging to check on status of stone as she feels it may be moving. Orders for labs were faxed to [phone], imaging orders shoul dbe faxed there as well. Reviewed Hydration, avoidance of bladder irritants and ED precautions.       Reason for Disposition   The patient has gross hematuria without clots, new onset, no urine testing    Answer Assessment - Initial Assessment Questions  1. When did you start with blood in your urine, and can you describe things in detail? Do you have any blood clots, is the hematuria continuous or intermittent and can you describe the color of the blood in your urine in relation to a beverage?   Last few days cranberry juice  2. Do you have lower back, flank, or lower abdominal/bladder pain?   Stent pain  3. Are you experiencing urinary frequency, urgency, pain or burning or any other changes in your urination like being unable to urinate?   Incomplete emptying  4. Do you take any blood thinners?   no  5. Have you recently taken rifampin or Pyridium? If yes, for what?   no  6. Have you had any recent urine testing or imaging? (UA with micro, urine culture, kidney ultrasound, CT scan)? Or any recent urologic surgery or procedures?   Known stones, CT 12/31  7. Have you ever had a workup for blood in the urine?   yes  8. Have you eaten a lot of red dye or beets recently?  no  9. Do you have a history of bladder cancer?   no    Protocols used: Urology-Gross Hemaruria-ADULT-[place]

## 2025-01-24 NOTE — TELEPHONE ENCOUNTER
Patient with multiple radiologic imaging in recent months.  However, patient with bilateral stones.  Reasonable to allow KUB due to complaints at this time

## 2025-01-27 NOTE — TELEPHONE ENCOUNTER
Patient called in to confirm imaging orders faxed to correct location. Advised faxed to [phone]. Attempted to confirm fax # but no one at imaging center answered phone. Also faxed to [phone] line search. Advised patient to call from center if they do not receive order.

## 2025-02-03 ENCOUNTER — RESULTS FOLLOW-UP (OUTPATIENT)
Dept: OTHER | Facility: HOSPITAL | Age: 50
End: 2025-02-03

## 2025-02-03 NOTE — TELEPHONE ENCOUNTER
Left message for patient to call the office back. Please relay message to patient when she returns the call.

## 2025-02-03 NOTE — TELEPHONE ENCOUNTER
----- Message from JULIEN Castillo sent at 2/3/2025 11:13 AM EST -----  Please see imaging encounter on information to deliver to patient    Patient called requesting imaging due to feeling of stone movement.  Please let her know the imaging did again identify a stone present in the right ureter. It also showed the stent was in place.  Please have her hydrate well with water to assist in flushing out the urinary system and strain her urine.

## 2025-02-11 ENCOUNTER — TELEPHONE (OUTPATIENT)
Age: 50
End: 2025-02-11

## 2025-02-11 NOTE — TELEPHONE ENCOUNTER
Patient called stating she had surgery yesterday 02/10/25  at [facility] and they were able to get the obstructing stone.  She has a new stent put in.  She will put it out herself on Friday.  She cancelled the appointment for 02/25/25 with Dr. Hansen.  She wanted the office to know.  She is feeling fine.

## 2025-02-11 NOTE — TELEPHONE ENCOUNTER
Patient was calling to make sure her appt was cancelled. She did it through the text option and just wanted to make sure it got cancelled    She then had a question in regards to what the appt on 2/10/2025 was for. Advised that is was for the same thing. The appt was just pushed out depending on when her surgery was    She then remembered the surgery pushed because of her abnormal EKG

## (undated) DEVICE — CATH URETERAL 5FR X 70 CM FLEX TIP POLYUR BARD

## (undated) DEVICE — URO CATCHER BAG STERILE 0-UC32

## (undated) DEVICE — TUBING SUCTION 5MM X 12 FT

## (undated) DEVICE — INVIEW CLEAR LEGGINGS: Brand: CONVERTORS

## (undated) DEVICE — PREMIUM DRY TRAY LF: Brand: MEDLINE INDUSTRIES, INC.

## (undated) DEVICE — SCD SEQUENTIAL COMPRESSION COMFORT SLEEVE MEDIUM KNEE LENGTH: Brand: KENDALL SCD

## (undated) DEVICE — URETERAL DUAL LUMEN CATH

## (undated) DEVICE — GUIDEWIRE STRGHT TIP 0.035 IN  SOLO PLUS

## (undated) DEVICE — CHLORHEXIDINE 4PCT 4 OZ

## (undated) DEVICE — PACK TUR

## (undated) DEVICE — DISPOSABLE OR TOWEL: Brand: CARDINAL HEALTH

## (undated) DEVICE — BASIC SINGLE BASIN 2-LF: Brand: MEDLINE INDUSTRIES, INC.

## (undated) DEVICE — GLOVE SRG BIOGEL 7

## (undated) DEVICE — LUBRICANT JELLY SURGILUBE TUBE 4OZ FLIP TOP